# Patient Record
Sex: FEMALE | Race: WHITE | NOT HISPANIC OR LATINO
[De-identification: names, ages, dates, MRNs, and addresses within clinical notes are randomized per-mention and may not be internally consistent; named-entity substitution may affect disease eponyms.]

---

## 2017-04-07 ENCOUNTER — APPOINTMENT (OUTPATIENT)
Dept: ULTRASOUND IMAGING | Facility: HOSPITAL | Age: 67
End: 2017-04-07

## 2017-04-07 ENCOUNTER — APPOINTMENT (OUTPATIENT)
Dept: MAMMOGRAPHY | Facility: HOSPITAL | Age: 67
End: 2017-04-07

## 2017-04-07 ENCOUNTER — OUTPATIENT (OUTPATIENT)
Dept: OUTPATIENT SERVICES | Facility: HOSPITAL | Age: 67
LOS: 1 days | End: 2017-04-07
Payer: MEDICARE

## 2017-04-07 PROCEDURE — 77063 BREAST TOMOSYNTHESIS BI: CPT

## 2017-04-07 PROCEDURE — 77067 SCR MAMMO BI INCL CAD: CPT

## 2017-04-07 PROCEDURE — 76641 ULTRASOUND BREAST COMPLETE: CPT

## 2018-05-02 ENCOUNTER — APPOINTMENT (OUTPATIENT)
Dept: ULTRASOUND IMAGING | Facility: HOSPITAL | Age: 68
End: 2018-05-02
Payer: MEDICARE

## 2018-05-02 ENCOUNTER — OUTPATIENT (OUTPATIENT)
Dept: OUTPATIENT SERVICES | Facility: HOSPITAL | Age: 68
LOS: 1 days | End: 2018-05-02
Payer: MEDICARE

## 2018-05-02 ENCOUNTER — APPOINTMENT (OUTPATIENT)
Dept: MAMMOGRAPHY | Facility: HOSPITAL | Age: 68
End: 2018-05-02
Payer: MEDICARE

## 2018-05-02 DIAGNOSIS — Z00.8 ENCOUNTER FOR OTHER GENERAL EXAMINATION: ICD-10-CM

## 2018-05-02 PROCEDURE — 77063 BREAST TOMOSYNTHESIS BI: CPT

## 2018-05-02 PROCEDURE — 77063 BREAST TOMOSYNTHESIS BI: CPT | Mod: 26

## 2018-05-02 PROCEDURE — 77067 SCR MAMMO BI INCL CAD: CPT | Mod: 26

## 2018-05-02 PROCEDURE — 76641 ULTRASOUND BREAST COMPLETE: CPT | Mod: 26

## 2018-05-02 PROCEDURE — 77067 SCR MAMMO BI INCL CAD: CPT

## 2018-05-02 PROCEDURE — 76641 ULTRASOUND BREAST COMPLETE: CPT

## 2019-06-17 ENCOUNTER — OUTPATIENT (OUTPATIENT)
Dept: OUTPATIENT SERVICES | Facility: HOSPITAL | Age: 69
LOS: 1 days | End: 2019-06-17
Payer: MEDICARE

## 2019-06-17 ENCOUNTER — APPOINTMENT (OUTPATIENT)
Dept: ULTRASOUND IMAGING | Facility: HOSPITAL | Age: 69
End: 2019-06-17
Payer: MEDICARE

## 2019-06-17 ENCOUNTER — APPOINTMENT (OUTPATIENT)
Dept: MAMMOGRAPHY | Facility: HOSPITAL | Age: 69
End: 2019-06-17
Payer: MEDICARE

## 2019-06-17 DIAGNOSIS — Z00.8 ENCOUNTER FOR OTHER GENERAL EXAMINATION: ICD-10-CM

## 2019-06-17 PROCEDURE — 77063 BREAST TOMOSYNTHESIS BI: CPT | Mod: 26

## 2019-06-17 PROCEDURE — 77063 BREAST TOMOSYNTHESIS BI: CPT

## 2019-06-17 PROCEDURE — 77067 SCR MAMMO BI INCL CAD: CPT | Mod: 26

## 2019-06-17 PROCEDURE — 76641 ULTRASOUND BREAST COMPLETE: CPT | Mod: 26,50

## 2019-06-17 PROCEDURE — 76641 ULTRASOUND BREAST COMPLETE: CPT

## 2019-06-17 PROCEDURE — 77067 SCR MAMMO BI INCL CAD: CPT

## 2020-02-03 ENCOUNTER — APPOINTMENT (OUTPATIENT)
Dept: UROGYNECOLOGY | Facility: CLINIC | Age: 70
End: 2020-02-03
Payer: MEDICARE

## 2020-02-03 DIAGNOSIS — Z82.49 FAMILY HISTORY OF ISCHEMIC HEART DISEASE AND OTHER DISEASES OF THE CIRCULATORY SYSTEM: ICD-10-CM

## 2020-02-03 DIAGNOSIS — K46.9 UNSPECIFIED ABDOMINAL HERNIA W/OUT OBSTRUCTION OR GANGRENE: ICD-10-CM

## 2020-02-03 DIAGNOSIS — I10 ESSENTIAL (PRIMARY) HYPERTENSION: ICD-10-CM

## 2020-02-03 DIAGNOSIS — Z78.9 OTHER SPECIFIED HEALTH STATUS: ICD-10-CM

## 2020-02-03 DIAGNOSIS — E78.00 PURE HYPERCHOLESTEROLEMIA, UNSPECIFIED: ICD-10-CM

## 2020-02-03 PROCEDURE — 99204 OFFICE O/P NEW MOD 45 MIN: CPT

## 2020-02-03 RX ORDER — METOPROLOL TARTRATE 75 MG/1
TABLET, FILM COATED ORAL
Refills: 0 | Status: ACTIVE | COMMUNITY

## 2020-02-03 RX ORDER — AMLODIPINE BESYLATE 5 MG/1
TABLET ORAL
Refills: 0 | Status: ACTIVE | COMMUNITY

## 2020-02-03 RX ORDER — ROSUVASTATIN CALCIUM 5 MG/1
TABLET, FILM COATED ORAL
Refills: 0 | Status: ACTIVE | COMMUNITY

## 2020-02-03 RX ORDER — CANDESARTAN CILEXETIL 4 MG/1
TABLET ORAL
Refills: 0 | Status: ACTIVE | COMMUNITY

## 2020-02-03 NOTE — HISTORY OF PRESENT ILLNESS
[Cystocele (Obstetric)] : frequent [Rectal Prolapse] : frequent [Stress Incontinence] : none [Urge Incontinence Of Urine] : none [Unable To Restrain Bowel Movement] : none [Feelings Of Urinary Urgency] : rare [Pain During Urination (Dysuria)] : rare [] : months ago [FreeTextEntry1] : This patient has pelvic organ prolapse, taken care of by  (gelhorn pessary).  She suggests the prolapse has gotten worse and is here for consultation.\par \par I asked her if the pessary. No longer hold up the prolapse, and she indicates that he does hold the prolapse, so it is unclear initially what she is referred today.\par \par On examination, the pessary is in good position. The pessary was snug and was removed. There were extensive excoriations at the apex of the vagina corresponding to the circumferential part of the Gelhorn pessary E. areas looked friable, but there was no jo bleeding.\par \par It is my judgment at this pessary needs to stay out of release 2 weeks to assess her level of prolapse and see whether these excoriations. Will begin to heal. We'll make judgment as to whether or not to continue to use a pessary or to consider reconstructive surgery in 2 weeks. On reassessment.\par \par The patient became quite upset with the idea of leaving the pessary out indicating that it will be very uncomfortable. I have counseled them regarding signs and symptoms of retention or infection which have not been a problem for her in the past. Explained the rationale and risks of bleeding erosion. If we leave the pessary in place.\par \par Have her return in 2 weeks and Dr. Armando schedule a day, when I can also examine her. She's not able to pessary, reinsertion. She will likely be interested in moving quickly towards reconstructive surgery and we will facilitate that scheduling issues on her to reschedule and we can examine her together.\par \par The examination today was immediately after removing the pessary and minimal prolapse was appreciated. Examination in 2 weeks would be more revealing

## 2020-02-03 NOTE — LETTER BODY
[Dear  ___] : Dear  [unfilled], [Attached please find my note.] : Attached please find my note. [Thank you very much for allowing me to participate in the care of this patient. If you have any questions, please do not hesitate to contact me] : Thank you very much for allowing me to participate in the care of this patient. If you have any questions, please do not hesitate to contact me. [FreeTextEntry1] : This is a 69-year-old patient with pelvic organ prolapse who has a Gellhorn pessary managed by Dr. Sanchez with pessary cleaning every 3 months.  She suggests that the prolapse has gotten worse and is here for consultation.  \par \par I asked her if the pessary. No longer hold up the prolapse, and she indicates that he does so, it is unclear initially why she was referred.\par \par On examination, the pessary is in good position. The pessary was snug and was removed. Her extensive excoriations at the apex of the vagina corresponding to the circumferential part of the Gellhorn pessary. The areas looked friable because no jo bleeding today.\par \par It is my just and that this pessary needs to stay out for 2 weeks to assess the level of the prolapse and to see whether these excoriations. Will begin to heal. Judgment as to whether or not to continue to use a pessary, which were treated her to surgery to assist in 2 weeks.\par \par The patient became quite upset with the idea of leaving the pessary out, indicating it is difficult to walk. The patient her daughter deny recurrent cystitis and/ or retention, because she was out. She suggests the prolapse to protrude quite a bit and to be unable to walk. I explained the rationale and risks of bleeding and erosion. He quite anxious to have the pessary. The reinserted or Motrin. Surgery.\par \par I will ask her to return Dr. Armando schedule, so that if surgery is desired became best expedite next obstetric history to surgery.\par \par The prolapse appreciated. Today, is immediately after the pessary was removed. Minimal prolapse was evident.

## 2020-02-21 ENCOUNTER — APPOINTMENT (OUTPATIENT)
Dept: UROGYNECOLOGY | Facility: CLINIC | Age: 70
End: 2020-02-21
Payer: MEDICARE

## 2020-02-21 DIAGNOSIS — R39.198 OTHER DIFFICULTIES WITH MICTURITION: ICD-10-CM

## 2020-02-21 PROCEDURE — 99215 OFFICE O/P EST HI 40 MIN: CPT

## 2020-02-21 PROCEDURE — A4562: CPT

## 2020-02-21 NOTE — PROCEDURE
[Good Fit] : fits well [Erosion] : evidence of erosion [Erythema] : no erythema [Refit] : refit needed [Pessary Inserted] : inserted [Infection] : no evidence of infection [Discharge] : no vaginal discharge [FreeTextEntry1] : 2.5 Yennifer [None] : no bleeding [FreeTextEntry2] : 2 3/4 gellhorn pt had was tight fit [de-identified] : on cervix

## 2020-02-21 NOTE — DISCUSSION/SUMMARY
[FreeTextEntry1] : I reviewed the above findings with the patient and her daughter and . Treatment options for the prolapse were discussed and included doing nothing, Kegel exercises and behavioral modification, a pessary, or surgical correction.Surgically we discussed the abdominal vs the vaginal routes. Abdominally we discussed a hysterectomy and a sacral colpopexy   laparoscopically and robotically.  Vaginally we discussed a vaginal hysterectomy, uterosacral suspension, and anterior/posterior repair vs closure.  pt will see how she feels with new pessary and is considering surgery.  If wants surgery we discussed urodyn and TVS.  pt to f/u in 2-3 weeks.  IUGA pt info on POP in english and Irish given.  All questions answered.\par

## 2020-02-21 NOTE — REASON FOR VISIT
[Follow-up Visit ___] : a follow-up visit  for [unfilled] [Patient Declined  Services] : - None: Patient declined  services [FreeTextEntry3] : pt's daughter interpreted. [FreeTextEntry2] : Natividad

## 2020-02-21 NOTE — HISTORY OF PRESENT ILLNESS
[Rectal Prolapse] : daily [Unable To Restrain Bowel Movement] : none [Urinary Frequency] : daily [de-identified] : pt has had pessary for about 15 years [FreeTextEntry5] : since pessary out. [FreeTextEntry4] : pt saw some blood when wiped when pessary removed [de-identified] : without pessary [FreeTextEntry1] : pessary out for the 3 weeks and pt has been uncomfortable without it.\par with pessary pt reports difficulty urinating flow is better with pessary\par

## 2020-02-21 NOTE — PHYSICAL EXAM
[No Acute Distress] : in no acute distress [Well developed] : well developed [Normal Mood/Affect] : mood and affect are normal [Well Nourished] : ~L well nourished [Soft, Nontender] : the abdomen was soft and nontender [Warm and Dry] : was warm and dry to touch [Aa ____] : Aa [unfilled] [Ba ____] : Ba [unfilled] [C ____] : C [unfilled] [GH ____] : GH [unfilled] [PB ____] : PB [unfilled] [TVL ____] : TVL  [unfilled] [Ap ____] : Ap [unfilled] [Bp ____] : Bp [unfilled] [Normal] : normal [Uterine Adnexae] : were not tender and not enlarged [D ____] : D [unfilled] [Exam Deferred] : was deferred [FreeTextEntry3] : + hypermobility [Anxiety] : patient is not anxious [de-identified] : + excoriation around cervix

## 2020-03-13 ENCOUNTER — APPOINTMENT (OUTPATIENT)
Dept: UROGYNECOLOGY | Facility: CLINIC | Age: 70
End: 2020-03-13
Payer: MEDICARE

## 2020-03-13 PROCEDURE — 99213 OFFICE O/P EST LOW 20 MIN: CPT

## 2020-03-13 NOTE — HISTORY OF PRESENT ILLNESS
[FreeTextEntry1] : PT was fitted with Gellhorn LS # 2 1/2 on 2/21/2020.  She was comfortable but had a BM and dislodged it and fell out.  PT called but wasn't able to get a sooner appt and place the pessary back in.  She can feel her prolapse overlapping a little.  Denies any pain, discomfort, or vaginal bleeding.  Voiding and moving with occasional straining.  \par Pt still contemplating about surgery.  She is aware to follow up with one of the surgeon if she decides to have surgery.  Was told she will need to have pelvic US if she decides to.

## 2020-03-13 NOTE — PROCEDURE
[Refit] : refit needed [Discharge] : there is vaginal discharge [Pessary Inserted] : inserted [Pessary Washed] : washed [H2O] : H2O [None] : no bleeding [Bowel Management] : Bowel Management: patient verbalizes understanding of daily dietary fiber intake [Bladder Training] : Bladder Training: Patient given information with verbal understanding [Good Fit] : fit is not good [Erythema] : no erythema [Infection] : no evidence of infection [FreeTextEntry1] : Yennifer RODRIGUEZ # 2 1/2. [de-identified] : overlapping and had fell out once with bearing down [de-identified] : Yennifer RODRIGUEZ # 2 3/4 [FreeTextEntry4] : Stool softener [FreeTextEntry8] : Reinforced daily pericare.

## 2020-03-13 NOTE — PHYSICAL EXAM
[No Acute Distress] : in no acute distress [Well developed] : well developed [Well Nourished] : ~L well nourished [Good Hygeine] : demonstrates good hygeine [Oriented x3] : oriented to person, place, and time [Normal Mood/Affect] : mood and affect are normal [Soft, Nontender] : the abdomen was soft and nontender [Warm and Dry] : was warm and dry to touch [Normal Gait] : gait was normal [Vulvar Atrophy] : vulvar atrophy [Atrophy] : atrophy [Cystocele] : a cystocele [Uterine Prolapse] : uterine prolapse [Discharge] : a  ~M vaginal discharge was present [Scant] : scant [Lakesha] : yellow [No Bleeding] : there was no active vaginal bleeding [Soft] :  the cervix was soft [de-identified] : Prolapse overlapping pessary.

## 2020-03-13 NOTE — DISCUSSION/SUMMARY
[FreeTextEntry1] : Fitted with Gellhorn LS # 2 3/4.  If pt decides to have surgery, she will need to leave her pessary out and will order Pelvic US (as per Dr. Brunner's recommendation).  Will make appt with surgeon.  \par Follow up in 1 month for pelvic prolapse/pessary check.  If pt have any problems/concern to call office.  PT aware and agrees.

## 2020-04-13 ENCOUNTER — APPOINTMENT (OUTPATIENT)
Dept: UROGYNECOLOGY | Facility: CLINIC | Age: 70
End: 2020-04-13

## 2020-06-05 ENCOUNTER — APPOINTMENT (OUTPATIENT)
Dept: UROGYNECOLOGY | Facility: CLINIC | Age: 70
End: 2020-06-05
Payer: MEDICARE

## 2020-06-05 VITALS — TEMPERATURE: 98.4 F

## 2020-06-05 PROCEDURE — 99213 OFFICE O/P EST LOW 20 MIN: CPT

## 2020-06-05 NOTE — DISCUSSION/SUMMARY
[FreeTextEntry1] : F/u 3 months or sooner if needed.  Advised colace PRN.  Instructed to call with any questions or concerns and she verbalizes understanding.\par

## 2020-06-05 NOTE — PROCEDURE
[Good Fit] : fits well [Erythema] : erythema noted [Discharge] : there is vaginal discharge [H2O] : H2O [Pessary Washed] : washed [Pessary Inserted] : inserted [None] : no bleeding [Erosion] : no evidence of erosion [Infection] : no evidence of infection [FreeTextEntry1] : michelle LS 2 3/4

## 2020-06-05 NOTE — HISTORY OF PRESENT ILLNESS
[FreeTextEntry1] : Pt happy with pessary.  Reports good support, denies any pelvic pain, pressure or vaginal bleeding.  Denies any problems with urination.  She does note one episode of constipation and straining for BM where she felt pessary coming down but she was able to push it up without difficulty.

## 2020-06-05 NOTE — PHYSICAL EXAM
[No Acute Distress] : in no acute distress [Well developed] : well developed [Well Nourished] : ~L well nourished [Good Hygeine] : demonstrates good hygeine [Oriented x3] : oriented to person, place, and time [Normal Memory] : ~T memory was ~L unimpaired [Normal Mood/Affect] : mood and affect are normal [Soft, Nontender] : the abdomen was soft and nontender [Warm and Dry] : was warm and dry to touch [Normal Gait] : gait was normal [Normal Appearance] : general appearance was normal [Atrophy] : atrophy [Cystocele] : a cystocele [Uterine Prolapse] : uterine prolapse [Discharge] : a  ~M vaginal discharge was present [Moderate] : moderate [Lakesha] : yellow [Normal] : normal [No Bleeding] : there was no active vaginal bleeding

## 2020-09-11 ENCOUNTER — APPOINTMENT (OUTPATIENT)
Dept: UROGYNECOLOGY | Facility: CLINIC | Age: 70
End: 2020-09-11
Payer: MEDICARE

## 2020-09-11 PROCEDURE — 99213 OFFICE O/P EST LOW 20 MIN: CPT

## 2020-09-11 NOTE — HISTORY OF PRESENT ILLNESS
[FreeTextEntry1] : Pt happy with pessary.  She reports good support.  Denies any pelvic pain, pressure or vaginal bleeding.  Denies any problems with urination or BM.  Denies any other changes since last visit.

## 2020-09-11 NOTE — PHYSICAL EXAM
[No Acute Distress] : in no acute distress [Well developed] : well developed [Well Nourished] : ~L well nourished [Good Hygeine] : demonstrates good hygeine [Oriented x3] : oriented to person, place, and time [Normal Memory] : ~T memory was ~L unimpaired [Normal Mood/Affect] : mood and affect are normal [Soft, Nontender] : the abdomen was soft and nontender [Warm and Dry] : was warm and dry to touch [Normal Gait] : gait was normal [Normal Appearance] : general appearance was normal [Atrophy] : atrophy [Cystocele] : a cystocele [Uterine Prolapse] : uterine prolapse [Discharge] : a  ~M vaginal discharge was present [White] : white [Scant] : scant [Normal] : normal [No Bleeding] : there was no active vaginal bleeding

## 2020-09-11 NOTE — PROCEDURE
[Good Fit] : fits well [Discharge] : there is vaginal discharge [Pessary Inserted] : inserted [H2O] : H2O [Pessary Washed] : washed [None] : no bleeding [Erosion] : no evidence of erosion [Infection] : no evidence of infection [Erythema] : no erythema [FreeTextEntry1] : michelle LS 2 3/4

## 2020-12-10 ENCOUNTER — APPOINTMENT (OUTPATIENT)
Dept: UROGYNECOLOGY | Facility: CLINIC | Age: 70
End: 2020-12-10
Payer: MEDICARE

## 2020-12-10 VITALS — DIASTOLIC BLOOD PRESSURE: 84 MMHG | TEMPERATURE: 97.5 F | HEART RATE: 85 BPM | SYSTOLIC BLOOD PRESSURE: 157 MMHG

## 2020-12-10 PROCEDURE — 99213 OFFICE O/P EST LOW 20 MIN: CPT

## 2020-12-10 NOTE — PROCEDURE
[Good Fit] : fits well [Discharge] : there is vaginal discharge [Pessary Inserted] : inserted [Pessary Washed] : washed [H2O] : H2O [None] : no bleeding [Erosion] : no evidence of erosion [Erythema] : no erythema [Infection] : no evidence of infection [FreeTextEntry1] : michelle LS 2 3/4

## 2020-12-10 NOTE — PHYSICAL EXAM
[No Acute Distress] : in no acute distress [Well developed] : well developed [Well Nourished] : ~L well nourished [Good Hygeine] : demonstrates good hygeine [Oriented x3] : oriented to person, place, and time [Normal Memory] : ~T memory was ~L unimpaired [Normal Mood/Affect] : mood and affect are normal [Soft, Nontender] : the abdomen was soft and nontender [Warm and Dry] : was warm and dry to touch [Normal Gait] : gait was normal [Normal Appearance] : general appearance was normal [Atrophy] : atrophy [Cystocele] : a cystocele [Uterine Prolapse] : uterine prolapse [Discharge] : a  ~M vaginal discharge was present [Scant] : scant [White] : white [No Bleeding] : there was no active vaginal bleeding [Normal] : normal

## 2021-02-24 ENCOUNTER — TRANSCRIPTION ENCOUNTER (OUTPATIENT)
Age: 71
End: 2021-02-24

## 2021-03-11 ENCOUNTER — APPOINTMENT (OUTPATIENT)
Dept: UROGYNECOLOGY | Facility: CLINIC | Age: 71
End: 2021-03-11
Payer: MEDICARE

## 2021-03-11 PROCEDURE — 99213 OFFICE O/P EST LOW 20 MIN: CPT

## 2021-03-11 NOTE — PROCEDURE
[Good Fit] : fits well [Erosion] : no evidence of erosion [Erythema] : no erythema [Discharge] : there is vaginal discharge [Infection] : no evidence of infection [Pessary Inserted] : inserted [Pessary Washed] : washed [H2O] : H2O [None] : no bleeding [FreeTextEntry1] : michelle LS 2 3/4

## 2021-06-10 ENCOUNTER — APPOINTMENT (OUTPATIENT)
Dept: UROGYNECOLOGY | Facility: CLINIC | Age: 71
End: 2021-06-10
Payer: MEDICARE

## 2021-06-10 VITALS
HEIGHT: 60 IN | HEART RATE: 86 BPM | OXYGEN SATURATION: 100 % | SYSTOLIC BLOOD PRESSURE: 166 MMHG | DIASTOLIC BLOOD PRESSURE: 81 MMHG | WEIGHT: 137 LBS | TEMPERATURE: 97.8 F | BODY MASS INDEX: 26.9 KG/M2

## 2021-06-10 DIAGNOSIS — N89.8 OTHER SPECIFIED NONINFLAMMATORY DISORDERS OF VAGINA: ICD-10-CM

## 2021-06-10 PROCEDURE — 99213 OFFICE O/P EST LOW 20 MIN: CPT

## 2021-07-21 ENCOUNTER — APPOINTMENT (OUTPATIENT)
Dept: UROGYNECOLOGY | Facility: CLINIC | Age: 71
End: 2021-07-21
Payer: MEDICARE

## 2021-07-21 VITALS — HEART RATE: 80 BPM | SYSTOLIC BLOOD PRESSURE: 154 MMHG | DIASTOLIC BLOOD PRESSURE: 77 MMHG

## 2021-07-21 PROCEDURE — 99213 OFFICE O/P EST LOW 20 MIN: CPT

## 2021-07-21 NOTE — HISTORY OF PRESENT ILLNESS
[FreeTextEntry1] : Patient with known hx of POP managed with pessary GH LS 2 3/4 presents to office for follow up and pessary check. patient is happy with the pessary. Denies any changes since last visit. Denies pelvic pressure, pain or vaginal bleeding. Denies urinary or bowel complaints.

## 2021-07-21 NOTE — PROCEDURE
[Good Fit] : fits well [Discharge] : there is vaginal discharge [Pessary Inserted] : inserted [Pessary Washed] : washed [H2O] : H2O [None] : no bleeding [Erosion] : no evidence of erosion [Erythema] : no erythema [Infection] : no evidence of infection [FreeTextEntry1] : michelle LS 2 3/4 [FreeTextEntry8] : Routine Lexii care

## 2021-07-21 NOTE — DISCUSSION/SUMMARY
[FreeTextEntry1] : 1. POP\par - Patient doing well with pessary\par - Pessary precautions and instructions reviewed\par - Will RTO in 3 months for follow up of POP or sooner if needed\par \par Patient agrees to call office with any questions or concerns, verbalized understanding.

## 2021-09-10 ENCOUNTER — APPOINTMENT (OUTPATIENT)
Dept: UROGYNECOLOGY | Facility: CLINIC | Age: 71
End: 2021-09-10

## 2021-09-13 ENCOUNTER — APPOINTMENT (OUTPATIENT)
Dept: NEUROLOGY | Facility: CLINIC | Age: 71
End: 2021-09-13
Payer: MEDICARE

## 2021-09-13 VITALS
HEART RATE: 79 BPM | WEIGHT: 137 LBS | DIASTOLIC BLOOD PRESSURE: 77 MMHG | OXYGEN SATURATION: 99 % | SYSTOLIC BLOOD PRESSURE: 150 MMHG | BODY MASS INDEX: 19.18 KG/M2 | HEIGHT: 71 IN

## 2021-09-13 DIAGNOSIS — G20 PARKINSON'S DISEASE: ICD-10-CM

## 2021-09-13 PROCEDURE — 99205 OFFICE O/P NEW HI 60 MIN: CPT

## 2021-09-13 NOTE — DISCUSSION/SUMMARY
[FreeTextEntry1] : 71-year-old right-handed female presents with chief complaints of one year history of left arm tremor. On exam patient is noted to have grade 2 resting tremor in the left upper and lower extremity. She has bilateral bradykinesia left worse than right. Slight increase in tone in the left upper extremity. Reduced left arm swing.\par \par Impression- parkinsonism, most likely idiopathic Parkinson's disease\par \par Plan\par i had an extensive discussion with the patient and her daughter. All the questions were addressed and answered\par MRI brain to rule out intracranial causes of left-sided tremor and slowness\par Jerald scan to assess dopamine function\par Trial of Sinemet, side effects discussed in detail. Patient would like to start this after her narrow angle glaucoma surgery which is coming up this week\par Followup in 2 months

## 2021-09-13 NOTE — PHYSICAL EXAM
[FreeTextEntry1] : on exam patient is awake and alert. She is very anxious.\par face is symmetric, tongue and uvula midline symmetric. Extraocular movements are intact\par Strength is 5/5 in all extremities\par Deep tendon reflexes are brisk but equal\par Grade 2 resting tremor is noticed in the left arm and leg\par Minimal postural tremor and intention tremor is seen\par Bilateral bradykinesia one on the right 3 on the left\par Leg agility is normal\par Rigidity zero on the right one on the left upper extremity\par No difficulty getting up from the chair\par Gait is remarkable for reduced left arm swing\par pull test is negative

## 2021-09-13 NOTE — HISTORY OF PRESENT ILLNESS
[FreeTextEntry1] : This is a 71-year-old right-handed female who presents with her daughter Natividad,with chief complaints of left hand tremor for about a year\par \par patient states that especially when she is under stress she's noticed that her left hand is shaking. This is mostly at rest. It does not interfere with daily activities. There are no changes in her fine motor skills. No slowness or stiffness\par \par she denies anosmia, sialorrhea, dysphagia, changes in memory. Denies any constipation or changes in control of urine. Denies any difficulty with sleep.No change in balance no falls\par \par reports having anxiety and recent panic attacks\par \par review of systems-a complete review of systems is performed and is negative except as listed in history of present illness\par \par past medical history- hypertension, high cholesterol panic attacks, glaucoma

## 2021-10-20 ENCOUNTER — APPOINTMENT (OUTPATIENT)
Dept: UROGYNECOLOGY | Facility: CLINIC | Age: 71
End: 2021-10-20
Payer: MEDICARE

## 2021-10-20 VITALS — TEMPERATURE: 97.1 F

## 2021-10-20 PROCEDURE — 99213 OFFICE O/P EST LOW 20 MIN: CPT

## 2021-10-20 NOTE — DISCUSSION/SUMMARY
[FreeTextEntry1] : 1. POP\par - Patient doing well with pessary\par - Pessary precautions and instructions reviewed\par - Advised to use OTC vaginal lotion replens \par - Will RTO in 3 months for follow up of POP or sooner if needed\par \par Patient agrees to call office with any questions or concerns, verbalized understanding.

## 2021-10-20 NOTE — PROCEDURE
[Good Fit] : fits well [Discharge] : there is vaginal discharge [Pessary Inserted] : inserted [Pessary Washed] : washed [H2O] : H2O [None] : no bleeding [Erythema] : erythema noted [Erosion] : no evidence of erosion [Infection] : no evidence of infection [FreeTextEntry1] : michelle LS 2 3/4 [de-identified] : mild erythema to posterior vaginal wall  [FreeTextEntry8] : Routine Lexii care

## 2021-10-20 NOTE — PHYSICAL EXAM
[No Acute Distress] : in no acute distress [Well developed] : well developed [Well Nourished] : ~L well nourished [Good Hygeine] : demonstrates good hygeine [Oriented x3] : oriented to person, place, and time [Normal Memory] : ~T memory was ~L unimpaired [Normal Mood/Affect] : mood and affect are normal [Soft, Nontender] : the abdomen was soft and nontender [Warm and Dry] : was warm and dry to touch [Normal Gait] : gait was normal [Labia Majora] : were normal [Labia Minora] : were normal [Atrophy] : atrophy [Normal Appearance] : general appearance was normal [Cystocele] : a cystocele [Uterine Prolapse] : uterine prolapse [Discharge] : a  ~M vaginal discharge was present [Scant] : scant [White] : white [No Bleeding] : there was no active vaginal bleeding [Normal] : normal

## 2022-01-03 ENCOUNTER — APPOINTMENT (OUTPATIENT)
Dept: NEUROLOGY | Facility: CLINIC | Age: 72
End: 2022-01-03

## 2022-01-21 ENCOUNTER — APPOINTMENT (OUTPATIENT)
Dept: UROGYNECOLOGY | Facility: CLINIC | Age: 72
End: 2022-01-21
Payer: MEDICARE

## 2022-01-21 VITALS — TEMPERATURE: 96.9 F

## 2022-01-21 PROCEDURE — 99213 OFFICE O/P EST LOW 20 MIN: CPT

## 2022-04-22 ENCOUNTER — APPOINTMENT (OUTPATIENT)
Dept: UROGYNECOLOGY | Facility: CLINIC | Age: 72
End: 2022-04-22
Payer: MEDICARE

## 2022-04-22 PROCEDURE — 99213 OFFICE O/P EST LOW 20 MIN: CPT

## 2022-04-22 NOTE — PHYSICAL EXAM
[No Acute Distress] : in no acute distress [Well developed] : well developed [Well Nourished] : ~L well nourished [Good Hygeine] : demonstrates good hygeine [Oriented x3] : oriented to person, place, and time [Normal Memory] : ~T memory was ~L unimpaired [Normal Mood/Affect] : mood and affect are normal [Soft, Nontender] : the abdomen was soft and nontender [Warm and Dry] : was warm and dry to touch [Normal Gait] : gait was normal [Vulvar Atrophy] : vulvar atrophy [Labia Majora] : were normal [Labia Minora] : were normal [Normal] : was normal [Normal Appearance] : general appearance was normal [Atrophy] : atrophy [Dry Mucosa] : dry mucosa [Cystocele] : a cystocele [Uterine Prolapse] : uterine prolapse [Discharge] : a  ~M vaginal discharge was present [Scant] : scant [White] : white [No Bleeding] : there was no active vaginal bleeding

## 2022-04-26 ENCOUNTER — APPOINTMENT (OUTPATIENT)
Dept: NEUROLOGY | Facility: CLINIC | Age: 72
End: 2022-04-26
Payer: MEDICARE

## 2022-04-26 VITALS
WEIGHT: 137 LBS | HEIGHT: 71 IN | DIASTOLIC BLOOD PRESSURE: 79 MMHG | BODY MASS INDEX: 19.18 KG/M2 | HEART RATE: 83 BPM | SYSTOLIC BLOOD PRESSURE: 155 MMHG

## 2022-04-26 PROCEDURE — 99214 OFFICE O/P EST MOD 30 MIN: CPT

## 2022-04-26 NOTE — PHYSICAL EXAM
[FreeTextEntry1] : on exam patient is awake and alert. She is very anxious.\par face is symmetric, tongue and uvula midline symmetric. Extraocular movements are intact\par Strength is 5/5 in all extremities\par Deep tendon reflexes are brisk but equal\par Intermittent grade 1 tremor is seen in the left upper extremity.  No tremors seen in the left leg today\par No postural tremor and intention tremor is seen\par Bilateral bradykinesia one on the right 2-3 on the left patient also has arthritis in her hands\par Leg agility is normal\par Rigidity zero on the right one on the left upper extremity\par No difficulty getting up from the chair\par Gait is remarkable for reduced left arm swing\par pull test is negative

## 2022-04-26 NOTE — HISTORY OF PRESENT ILLNESS
[FreeTextEntry1] : Alicia is a 70y/o female with h/o pelvic organ prolapse managed with pessary GH LS #2 3/4.  Patient is satisfied with the support provided by pessary. She denies pelvic pain or vaginal bleeding. She denies urinary or bowel complaints.

## 2022-04-26 NOTE — DATA REVIEWED
[de-identified] : MRI of the brain done 2/15/2022 without contrast shows minimal small vessel ischemic disease changes and partially empty sella

## 2022-04-26 NOTE — DISCUSSION/SUMMARY
[FreeTextEntry1] : #Pelvic organ prolapse\par - Patient doing well with pessary\par - Pessary precautions and instructions reviewed\par - Advised to use OTC vaginal lotion (ie replens) \par - Will RTO in 3 months for follow or sooner if needed\par \par Patient agrees to call office with any questions or concerns, verbalized understanding.

## 2022-04-26 NOTE — DISCUSSION/SUMMARY
[FreeTextEntry1] : 71-year-old right-handed female presents with chief complaints of  left arm tremor since 2021.  MRI brain with minimal small vessel changes.  Patient reports 90% improvement of symptoms with Sinemet and no side effects.  Tremors much improved.  Still noted to have moderate bradykinesia\par \par Impression- parkinsonism, most likely idiopathic Parkinson's disease\par \par Plan\par i had an extensive discussion with the patient and her daughter. All the questions were addressed and answered\par Results of MRI brain were discussed with the patient\par Continue Sinemet 1 tablet 3 times a day.  Patient will change timing so that she takes it every 5 hours.  She does not wish to increase at present time although she has some bradykinesia it does not interfere with daily functioning\par Recommended treatment for anxiety.  Offered Lexapro.  Patient declines.  States that they today she has no problems it is it is only occasionally for traveling and doctors visits where she finds it to be exaggerated.  She states that her primary care physician did give her something to take as needed.  She will let me know the name of it she is interested in trial of clonazepam/Ativan.  I am not sure what was prescribed by her primary care physician they will call and let me know\par Fall precautions, physical therapy prescription provided\par Followup in 3 months

## 2022-04-26 NOTE — PROCEDURE
[Good Fit] : fits well [Pessary Inserted] : inserted [Pessary Washed] : washed [None] : no bleeding [Medication Review] : Medicaiton Review: Patient verbalizes understanding of risks and benefits [Refit] : refit is not needed [Erythema] : no erythema [Erosion] : no evidence of erosion [Discharge] : no vaginal discharge [Infection] : no evidence of infection [FreeTextEntry8] : Routine Lexii care [FreeTextEntry1] : Radha LS 2 3/4

## 2022-05-05 ENCOUNTER — NON-APPOINTMENT (OUTPATIENT)
Age: 72
End: 2022-05-05

## 2022-05-06 ENCOUNTER — NON-APPOINTMENT (OUTPATIENT)
Age: 72
End: 2022-05-06

## 2022-06-03 ENCOUNTER — APPOINTMENT (OUTPATIENT)
Dept: NEUROLOGY | Facility: CLINIC | Age: 72
End: 2022-06-03

## 2022-06-21 ENCOUNTER — APPOINTMENT (OUTPATIENT)
Dept: RADIOLOGY | Facility: HOSPITAL | Age: 72
End: 2022-06-21
Payer: MEDICARE

## 2022-06-21 ENCOUNTER — OUTPATIENT (OUTPATIENT)
Dept: OUTPATIENT SERVICES | Facility: HOSPITAL | Age: 72
LOS: 1 days | End: 2022-06-21
Payer: MEDICARE

## 2022-06-21 DIAGNOSIS — Z00.8 ENCOUNTER FOR OTHER GENERAL EXAMINATION: ICD-10-CM

## 2022-06-21 PROCEDURE — 71046 X-RAY EXAM CHEST 2 VIEWS: CPT

## 2022-06-21 PROCEDURE — 71046 X-RAY EXAM CHEST 2 VIEWS: CPT | Mod: 26

## 2022-07-25 ENCOUNTER — APPOINTMENT (OUTPATIENT)
Dept: UROGYNECOLOGY | Facility: CLINIC | Age: 72
End: 2022-07-25

## 2022-07-25 VITALS
OXYGEN SATURATION: 99 % | SYSTOLIC BLOOD PRESSURE: 146 MMHG | HEART RATE: 85 BPM | DIASTOLIC BLOOD PRESSURE: 76 MMHG | TEMPERATURE: 96.9 F

## 2022-07-25 PROCEDURE — 99213 OFFICE O/P EST LOW 20 MIN: CPT

## 2022-07-29 ENCOUNTER — APPOINTMENT (OUTPATIENT)
Dept: NEUROLOGY | Facility: CLINIC | Age: 72
End: 2022-07-29

## 2022-09-13 ENCOUNTER — APPOINTMENT (OUTPATIENT)
Dept: NEUROLOGY | Facility: CLINIC | Age: 72
End: 2022-09-13

## 2022-09-13 VITALS
DIASTOLIC BLOOD PRESSURE: 76 MMHG | HEART RATE: 87 BPM | SYSTOLIC BLOOD PRESSURE: 156 MMHG | WEIGHT: 136 LBS | HEIGHT: 71 IN | BODY MASS INDEX: 19.04 KG/M2

## 2022-09-13 PROCEDURE — 99214 OFFICE O/P EST MOD 30 MIN: CPT

## 2022-09-13 NOTE — DISCUSSION/SUMMARY
[FreeTextEntry1] : 72-year-old right-handed female presents with chief complaints of  left arm tremor since 2021.  MRI brain with minimal small vessel changes.  Patient reports 90% improvement of symptoms with Sinemet and no side effects.  Tremors much improved.  Still noted to have moderate bradykinesia\par \par Impression- parkinsonism, most likely idiopathic Parkinson's disease\par \par Plan\par i had an extensive discussion with the patient and her . All the questions were addressed and answered\par Continue Sinemet 1 tablet 3 times a day.  \par Fall precautions\par Follow-up in 3 months

## 2022-09-13 NOTE — PHYSICAL EXAM
[FreeTextEntry1] : on exam patient is awake and alert. She is very anxious.\par face is symmetric, tongue and uvula midline symmetric. Extraocular movements are intact\par Strength is 5/5 in all extremities\par Deep tendon reflexes are brisk but equal\par Intermittent grade 1 tremor is seen in the left upper extremity. \par No postural tremor and intention tremor is seen\par Bilateral bradykinesia 0 on the right 2 on the left patient also has arthritis in her hands\par Leg agility is normal\par Rigidity zero on the right one on the left upper extremity\par No difficulty getting up from the chair\par Gait is remarkable for reduced left arm swing\par pull test is negative

## 2022-09-13 NOTE — HISTORY OF PRESENT ILLNESS
[FreeTextEntry1] : This is a 71-year-old right-handed female who presents with her daughter Natividad,with chief complaints of left hand tremor for about a year\par \par patient states that especially when she is under stress she's noticed that her left hand is shaking. This is mostly at rest. It does not interfere with daily activities. There are no changes in her fine motor skills. No slowness or stiffness\par \par she denies anosmia, sialorrhea, dysphagia, changes in memory. Denies any constipation or changes in control of urine. Denies any difficulty with sleep.No change in balance no falls\par \par reports having anxiety and recent panic attacks\par \par review of systems-a complete review of systems is performed and is negative except as listed in history of present illness\par \par past medical history- hypertension, high cholesterol panic attacks, glaucoma\par \par Interval history-April 26, 2022 patient presents to follow-up on Parkinson's disease.  At this visit she is accompanied by her daughter Kathy.  Patient reports 90% improvement in symptoms on initiating Sinemet.  She denies any side effects on this medication.  Currently takes it at 8 AM, 12 noon and 8 PM.  She goes to bed around 10.  She was doing quite well until this Saturday when she slipped on a wet surface.  She sustained some bruising on the left hip and thigh.  She followed with her primary care physician.  Denies having any fractures.  She reports significant anxiety.  She states it is especially worse for doctors visits.  She is following up with ophthalmology for narrow angle glaucoma and states that she was unable to even sit still for the examination because when she is nervous she has a lot of tremor.\par \par Interval history September 13, 2022.  Patient is accompanied by her  to follow-up on Parkinson's disease.  She states that she is doing quite well denies any side effects has not had any falls she continues to take Sinemet 25/100, 1 tablet 3 times a day 5 hours apart.  She did go for physical therapy and is not doing the exercises at home.  States that she notices the tremor only when she is anxious.  States that her ability to do activities is  improved significantly

## 2022-09-13 NOTE — DATA REVIEWED
[de-identified] : MRI of the brain done 2/15/2022 without contrast shows minimal small vessel ischemic disease changes and partially empty sella

## 2022-10-24 ENCOUNTER — APPOINTMENT (OUTPATIENT)
Dept: UROGYNECOLOGY | Facility: CLINIC | Age: 72
End: 2022-10-24

## 2022-10-24 VITALS — TEMPERATURE: 96.9 F

## 2022-10-24 PROCEDURE — 99213 OFFICE O/P EST LOW 20 MIN: CPT

## 2022-11-16 ENCOUNTER — OUTPATIENT (OUTPATIENT)
Dept: OUTPATIENT SERVICES | Facility: HOSPITAL | Age: 72
LOS: 1 days | End: 2022-11-16
Payer: MEDICARE

## 2022-11-16 ENCOUNTER — APPOINTMENT (OUTPATIENT)
Dept: MAMMOGRAPHY | Facility: HOSPITAL | Age: 72
End: 2022-11-16

## 2022-11-16 ENCOUNTER — APPOINTMENT (OUTPATIENT)
Dept: ULTRASOUND IMAGING | Facility: HOSPITAL | Age: 72
End: 2022-11-16

## 2022-11-16 DIAGNOSIS — Z00.8 ENCOUNTER FOR OTHER GENERAL EXAMINATION: ICD-10-CM

## 2022-11-16 PROCEDURE — 76641 ULTRASOUND BREAST COMPLETE: CPT

## 2022-11-16 PROCEDURE — 77067 SCR MAMMO BI INCL CAD: CPT | Mod: 26

## 2022-11-16 PROCEDURE — 76641 ULTRASOUND BREAST COMPLETE: CPT | Mod: 26,50

## 2022-11-16 PROCEDURE — 77063 BREAST TOMOSYNTHESIS BI: CPT | Mod: 26

## 2022-11-16 PROCEDURE — 77063 BREAST TOMOSYNTHESIS BI: CPT

## 2022-11-16 PROCEDURE — 77067 SCR MAMMO BI INCL CAD: CPT

## 2023-01-23 ENCOUNTER — APPOINTMENT (OUTPATIENT)
Dept: UROGYNECOLOGY | Facility: CLINIC | Age: 73
End: 2023-01-23
Payer: MEDICARE

## 2023-01-23 VITALS
BODY MASS INDEX: 27.75 KG/M2 | SYSTOLIC BLOOD PRESSURE: 134 MMHG | DIASTOLIC BLOOD PRESSURE: 79 MMHG | HEART RATE: 75 BPM | OXYGEN SATURATION: 100 % | WEIGHT: 147 LBS | HEIGHT: 61 IN

## 2023-01-23 DIAGNOSIS — N81.9 FEMALE GENITAL PROLAPSE, UNSPECIFIED: ICD-10-CM

## 2023-01-23 PROCEDURE — 99213 OFFICE O/P EST LOW 20 MIN: CPT

## 2023-02-01 ENCOUNTER — APPOINTMENT (OUTPATIENT)
Dept: NEUROLOGY | Facility: CLINIC | Age: 73
End: 2023-02-01
Payer: MEDICARE

## 2023-02-01 VITALS
RESPIRATION RATE: 17 BRPM | HEART RATE: 77 BPM | WEIGHT: 143 LBS | HEIGHT: 61 IN | DIASTOLIC BLOOD PRESSURE: 81 MMHG | BODY MASS INDEX: 27 KG/M2 | SYSTOLIC BLOOD PRESSURE: 147 MMHG

## 2023-02-01 PROCEDURE — 99214 OFFICE O/P EST MOD 30 MIN: CPT

## 2023-02-01 NOTE — PHYSICAL EXAM
[FreeTextEntry1] : on exam patient is awake and alert. She is very anxious.\par face is symmetric, tongue and uvula midline symmetric. Extraocular movements are intact\par Strength is 5/5 in all extremities\par Deep tendon reflexes are brisk but equal\par Intermittent grade 1 tremor is seen in the left upper extremity. \par No postural tremor and intention tremor is seen\par Bilateral bradykinesia 1 on the right 2 on the left patient also has arthritis in her hands\par Leg agility is normal\par Rigidity zero on the right one on the left upper extremity\par No difficulty getting up from the chair\par Gait is remarkable for reduced left arm swing, stooped posture\par pull test is negative

## 2023-02-01 NOTE — HISTORY OF PRESENT ILLNESS
[FreeTextEntry1] : This is a 71-year-old right-handed female who presents with her daughter Natividad,with chief complaints of left hand tremor for about a year\par \par patient states that especially when she is under stress she's noticed that her left hand is shaking. This is mostly at rest. It does not interfere with daily activities. There are no changes in her fine motor skills. No slowness or stiffness\par \par she denies anosmia, sialorrhea, dysphagia, changes in memory. Denies any constipation or changes in control of urine. Denies any difficulty with sleep.No change in balance no falls\par \par reports having anxiety and recent panic attacks\par \par review of systems-a complete review of systems is performed and is negative except as listed in history of present illness\par \par past medical history- hypertension, high cholesterol panic attacks, glaucoma\par \par Interval history-April 26, 2022 patient presents to follow-up on Parkinson's disease.  At this visit she is accompanied by her daughter Kathy.  Patient reports 90% improvement in symptoms on initiating Sinemet.  She denies any side effects on this medication.  Currently takes it at 8 AM, 12 noon and 8 PM.  She goes to bed around 10.  She was doing quite well until this Saturday when she slipped on a wet surface.  She sustained some bruising on the left hip and thigh.  She followed with her primary care physician.  Denies having any fractures.  She reports significant anxiety.  She states it is especially worse for doctors visits.  She is following up with ophthalmology for narrow angle glaucoma and states that she was unable to even sit still for the examination because when she is nervous she has a lot of tremor.\par \par Interval history September 13, 2022.  Patient is accompanied by her  to follow-up on Parkinson's disease.  She states that she is doing quite well denies any side effects has not had any falls she continues to take Sinemet 25/100, 1 tablet 3 times a day 5 hours apart.  She did go for physical therapy and is not doing the exercises at home.  States that she notices the tremor only when she is anxious.  States that her ability to do activities is  improved significantly\par \par Interval history February 1, 2023 patient is accompanied by her  to follow-up on Parkinson's disease.  She states that she is doing quite well.  She is having no difficulty with daily activities only when she is anxious such as coming to the doctor's office she notices more tremor.  No falls no difficulty swallowing memory is good no hallucinations no side effects with medication.  She is tolerating 1 tablet 3 times a day 5 hours apart.  She tries to exercise every day and stays busy with household chores

## 2023-02-01 NOTE — DATA REVIEWED
[de-identified] : MRI of the brain done 2/15/2022 without contrast shows minimal small vessel ischemic disease changes and partially empty sella

## 2023-02-01 NOTE — DISCUSSION/SUMMARY
[FreeTextEntry1] : 72-year-old right-handed female presents with chief complaints of  left arm tremor since 2021.  MRI brain with minimal small vessel changes.  Patient reports 90% improvement of symptoms with Sinemet and no side effects.  Tremors much improved.  Still noted to have moderate bradykinesia\par \par Impression- parkinsonism, most likely idiopathic Parkinson's disease\par \par Plan\par Increase Sinemet to 1-1/2 tablets 3 times a day.  Side effects were again discussed in detail\par PT OT\par May consider antianxiety medications next visit\par All questions addressed and answered\par Follow-up in 3 months

## 2023-04-20 ENCOUNTER — NON-APPOINTMENT (OUTPATIENT)
Age: 73
End: 2023-04-20

## 2023-04-20 DIAGNOSIS — M85.80 OTHER SPECIFIED DISORDERS OF BONE DENSITY AND STRUCTURE, UNSPECIFIED SITE: ICD-10-CM

## 2023-04-20 DIAGNOSIS — Z92.89 PERSONAL HISTORY OF OTHER MEDICAL TREATMENT: ICD-10-CM

## 2023-04-20 DIAGNOSIS — Z46.89 ENCOUNTER FOR FITTING AND ADJUSTMENT OF OTHER SPECIFIED DEVICES: ICD-10-CM

## 2023-04-24 ENCOUNTER — APPOINTMENT (OUTPATIENT)
Dept: UROGYNECOLOGY | Facility: CLINIC | Age: 73
End: 2023-04-24
Payer: MEDICARE

## 2023-04-24 PROCEDURE — 99213 OFFICE O/P EST LOW 20 MIN: CPT

## 2023-06-13 ENCOUNTER — APPOINTMENT (OUTPATIENT)
Dept: NEUROLOGY | Facility: CLINIC | Age: 73
End: 2023-06-13
Payer: MEDICARE

## 2023-06-13 VITALS
DIASTOLIC BLOOD PRESSURE: 81 MMHG | BODY MASS INDEX: 27.19 KG/M2 | HEIGHT: 61 IN | SYSTOLIC BLOOD PRESSURE: 148 MMHG | WEIGHT: 144 LBS | HEART RATE: 79 BPM

## 2023-06-13 PROCEDURE — 99214 OFFICE O/P EST MOD 30 MIN: CPT

## 2023-06-13 NOTE — HISTORY OF PRESENT ILLNESS
[FreeTextEntry1] : This is a 71-year-old right-handed female who presents with her daughter Natividad,with chief complaints of left hand tremor for about a year\par \par patient states that especially when she is under stress she's noticed that her left hand is shaking. This is mostly at rest. It does not interfere with daily activities. There are no changes in her fine motor skills. No slowness or stiffness\par \par she denies anosmia, sialorrhea, dysphagia, changes in memory. Denies any constipation or changes in control of urine. Denies any difficulty with sleep.No change in balance no falls\par \par reports having anxiety and recent panic attacks\par \par review of systems-a complete review of systems is performed and is negative except as listed in history of present illness\par \par past medical history- hypertension, high cholesterol panic attacks, glaucoma\par \par Interval history-April 26, 2022 patient presents to follow-up on Parkinson's disease.  At this visit she is accompanied by her daughter Kathy.  Patient reports 90% improvement in symptoms on initiating Sinemet.  She denies any side effects on this medication.  Currently takes it at 8 AM, 12 noon and 8 PM.  She goes to bed around 10.  She was doing quite well until this Saturday when she slipped on a wet surface.  She sustained some bruising on the left hip and thigh.  She followed with her primary care physician.  Denies having any fractures.  She reports significant anxiety.  She states it is especially worse for doctors visits.  She is following up with ophthalmology for narrow angle glaucoma and states that she was unable to even sit still for the examination because when she is nervous she has a lot of tremor.\par \par Interval history September 13, 2022.  Patient is accompanied by her  to follow-up on Parkinson's disease.  She states that she is doing quite well denies any side effects has not had any falls she continues to take Sinemet 25/100, 1 tablet 3 times a day 5 hours apart.  She did go for physical therapy and is not doing the exercises at home.  States that she notices the tremor only when she is anxious.  States that her ability to do activities is  improved significantly\par \par Interval history February 1, 2023 patient is accompanied by her  to follow-up on Parkinson's disease.  She states that she is doing quite well.  She is having no difficulty with daily activities only when she is anxious such as coming to the doctor's office she notices more tremor.  No falls no difficulty swallowing memory is good no hallucinations no side effects with medication.  She is tolerating 1 tablet 3 times a day 5 hours apart.  She tries to exercise every day and stays busy with household chores\par \par Interval history June 13, 2023 patient is tolerating Sinemet 25/100, 1.5 tablets 3 times a day.  Denies any wearing off denies any side effects states that symptoms are very well controlled.  No dysphagia no falls states that sometimes she gets anxious but it is manageable.  No hallucinations no changes in memory sleeps well at night.  States that she did very well after physical therapy and tries to stay active at home and does the exercises by herself is requesting another PT prescription

## 2023-06-13 NOTE — DATA REVIEWED
[de-identified] : MRI of the brain done 2/15/2022 without contrast shows minimal small vessel ischemic disease changes and partially empty sella

## 2023-06-13 NOTE — DISCUSSION/SUMMARY
[FreeTextEntry1] : 72-year-old right-handed female presents with chief complaints of  left arm tremor since 2021.  MRI brain with minimal small vessel changes.  Patient reports 90% improvement of symptoms with Sinemet and no side effects.  Tremors much improved.  Still noted to have moderate bradykinesia\par \par Impression- parkinsonism, most likely idiopathic Parkinson's disease\par \par Plan\par Continue Sinemet 1-1/2 tablets 3 times a day.  \par PT \par Offered antianxiety medications patient states that she does not need it and is doing well\par All questions addressed and answered\par Follow-up in 3 months

## 2023-06-13 NOTE — PHYSICAL EXAM
[FreeTextEntry1] : on exam patient is awake and alert. She is very anxious.\par face is symmetric, tongue and uvula midline symmetric. Extraocular movements are intact\par Strength is 5/5 in all extremities\par Deep tendon reflexes are brisk but equal\par Rare intermittent grade 1 tremor is seen in the left upper extremity. \par No postural tremor and intention tremor is seen\par  bradykinesia 0 on the right 1 on the left patient also has arthritis in her hands\par Leg agility is normal\par Rigidity zero on the right one on the left upper extremity\par No difficulty getting up from the chair\par Gait is remarkable for reduced left arm swing, stooped posture\par pull test is negative

## 2023-07-21 ENCOUNTER — APPOINTMENT (OUTPATIENT)
Dept: UROGYNECOLOGY | Facility: CLINIC | Age: 73
End: 2023-07-21
Payer: MEDICARE

## 2023-07-21 VITALS
DIASTOLIC BLOOD PRESSURE: 62 MMHG | SYSTOLIC BLOOD PRESSURE: 128 MMHG | HEIGHT: 61 IN | BODY MASS INDEX: 27.19 KG/M2 | WEIGHT: 144 LBS

## 2023-07-21 PROCEDURE — 99213 OFFICE O/P EST LOW 20 MIN: CPT

## 2023-09-29 ENCOUNTER — NON-APPOINTMENT (OUTPATIENT)
Age: 73
End: 2023-09-29

## 2023-10-05 ENCOUNTER — APPOINTMENT (OUTPATIENT)
Dept: ORTHOPEDIC SURGERY | Facility: CLINIC | Age: 73
End: 2023-10-05
Payer: MEDICARE

## 2023-10-05 VITALS — HEIGHT: 59 IN | BODY MASS INDEX: 29.23 KG/M2 | WEIGHT: 145 LBS

## 2023-10-05 PROCEDURE — 73564 X-RAY EXAM KNEE 4 OR MORE: CPT | Mod: LT

## 2023-10-05 PROCEDURE — 76882 US LMTD JT/FCL EVL NVASC XTR: CPT | Mod: LT

## 2023-10-05 PROCEDURE — 99203 OFFICE O/P NEW LOW 30 MIN: CPT | Mod: 25

## 2023-10-05 RX ORDER — MULTIVITAMIN/IRON/FOLIC ACID 18MG-0.4MG
TABLET ORAL
Refills: 0 | Status: ACTIVE | COMMUNITY

## 2023-10-05 RX ORDER — CANDESARTAN CILEXETIL 16 MG/1
16 TABLET ORAL
Refills: 0 | Status: ACTIVE | COMMUNITY

## 2023-10-13 ENCOUNTER — APPOINTMENT (OUTPATIENT)
Dept: NEUROLOGY | Facility: CLINIC | Age: 73
End: 2023-10-13
Payer: MEDICARE

## 2023-10-13 VITALS
BODY MASS INDEX: 29.23 KG/M2 | WEIGHT: 145 LBS | DIASTOLIC BLOOD PRESSURE: 73 MMHG | HEIGHT: 59 IN | SYSTOLIC BLOOD PRESSURE: 146 MMHG | HEART RATE: 75 BPM

## 2023-10-13 PROCEDURE — 99213 OFFICE O/P EST LOW 20 MIN: CPT

## 2023-10-13 PROCEDURE — 99214 OFFICE O/P EST MOD 30 MIN: CPT

## 2023-10-13 RX ORDER — GABAPENTIN 100 MG/1
100 CAPSULE ORAL 3 TIMES DAILY
Qty: 180 | Refills: 3 | Status: ACTIVE | COMMUNITY
Start: 2023-10-13 | End: 1900-01-01

## 2023-10-20 ENCOUNTER — APPOINTMENT (OUTPATIENT)
Dept: UROGYNECOLOGY | Facility: CLINIC | Age: 73
End: 2023-10-20
Payer: MEDICARE

## 2023-10-20 VITALS
DIASTOLIC BLOOD PRESSURE: 75 MMHG | SYSTOLIC BLOOD PRESSURE: 133 MMHG | HEART RATE: 73 BPM | BODY MASS INDEX: 29.23 KG/M2 | WEIGHT: 145 LBS | HEIGHT: 59 IN

## 2023-10-20 PROCEDURE — 99213 OFFICE O/P EST LOW 20 MIN: CPT

## 2023-10-24 ENCOUNTER — APPOINTMENT (OUTPATIENT)
Dept: ORTHOPEDIC SURGERY | Facility: CLINIC | Age: 73
End: 2023-10-24
Payer: MEDICARE

## 2023-10-24 VITALS — BODY MASS INDEX: 29.23 KG/M2 | HEIGHT: 59 IN | WEIGHT: 145 LBS

## 2023-10-24 PROCEDURE — 99214 OFFICE O/P EST MOD 30 MIN: CPT

## 2023-10-25 RX ORDER — CELECOXIB 200 MG/1
200 CAPSULE ORAL
Qty: 30 | Refills: 3 | Status: ACTIVE | COMMUNITY
Start: 2023-10-25 | End: 1900-01-01

## 2023-11-06 ENCOUNTER — RX RENEWAL (OUTPATIENT)
Age: 73
End: 2023-11-06

## 2023-11-27 ENCOUNTER — APPOINTMENT (OUTPATIENT)
Dept: UROGYNECOLOGY | Facility: CLINIC | Age: 73
End: 2023-11-27
Payer: MEDICARE

## 2023-11-27 PROCEDURE — 99213 OFFICE O/P EST LOW 20 MIN: CPT

## 2023-12-15 ENCOUNTER — OUTPATIENT (OUTPATIENT)
Dept: OUTPATIENT SERVICES | Facility: HOSPITAL | Age: 73
LOS: 1 days | End: 2023-12-15
Payer: MEDICARE

## 2023-12-15 ENCOUNTER — APPOINTMENT (OUTPATIENT)
Dept: MAMMOGRAPHY | Facility: CLINIC | Age: 73
End: 2023-12-15
Payer: MEDICARE

## 2023-12-15 ENCOUNTER — APPOINTMENT (OUTPATIENT)
Dept: ULTRASOUND IMAGING | Facility: CLINIC | Age: 73
End: 2023-12-15
Payer: MEDICARE

## 2023-12-15 DIAGNOSIS — Z00.8 ENCOUNTER FOR OTHER GENERAL EXAMINATION: ICD-10-CM

## 2023-12-15 PROCEDURE — 77067 SCR MAMMO BI INCL CAD: CPT

## 2023-12-15 PROCEDURE — 77063 BREAST TOMOSYNTHESIS BI: CPT | Mod: 26

## 2023-12-15 PROCEDURE — 76641 ULTRASOUND BREAST COMPLETE: CPT | Mod: 26,50,GY

## 2023-12-15 PROCEDURE — 77063 BREAST TOMOSYNTHESIS BI: CPT

## 2023-12-15 PROCEDURE — 77067 SCR MAMMO BI INCL CAD: CPT | Mod: 26

## 2023-12-15 PROCEDURE — 76641 ULTRASOUND BREAST COMPLETE: CPT

## 2024-02-15 ENCOUNTER — APPOINTMENT (OUTPATIENT)
Dept: NEUROLOGY | Facility: CLINIC | Age: 74
End: 2024-02-15
Payer: MEDICARE

## 2024-02-15 VITALS
HEART RATE: 77 BPM | BODY MASS INDEX: 29.03 KG/M2 | DIASTOLIC BLOOD PRESSURE: 75 MMHG | SYSTOLIC BLOOD PRESSURE: 137 MMHG | HEIGHT: 59 IN | WEIGHT: 144 LBS

## 2024-02-15 DIAGNOSIS — G20.A1 PARKINSON'S DISEASE WITHOUT DYSKINESIA, WITHOUT MENTION OF FLUCTUATIONS: ICD-10-CM

## 2024-02-15 PROCEDURE — G2211 COMPLEX E/M VISIT ADD ON: CPT

## 2024-02-15 PROCEDURE — 99214 OFFICE O/P EST MOD 30 MIN: CPT

## 2024-02-15 NOTE — DATA REVIEWED
[de-identified] : MRI of the brain done 2/15/2022 without contrast shows minimal small vessel ischemic disease changes and partially empty sella

## 2024-02-15 NOTE — DISCUSSION/SUMMARY
[FreeTextEntry1] : 73-year-old right-handed female presents with chief complaints of  left arm tremor since 2021.  MRI brain with minimal small vessel changes.  Patient reports 90% improvement of symptoms with Sinemet and no side effects.  Tremors much improved.    Impression- parkinsonism, most likely idiopathic Parkinson's disease  Plan Continue Sinemet 1-1/2 tablets 3 times a day.   PT  Offered antianxiety medications patient states that she does not need it and is doing well All questions addressed and answered Follow-up in 5 months

## 2024-02-15 NOTE — HISTORY OF PRESENT ILLNESS
[FreeTextEntry1] : This is a 71-year-old right-handed female who presents with her daughter Natividad,with chief complaints of left hand tremor for about a year  patient states that especially when she is under stress she's noticed that her left hand is shaking. This is mostly at rest. It does not interfere with daily activities. There are no changes in her fine motor skills. No slowness or stiffness  she denies anosmia, sialorrhea, dysphagia, changes in memory. Denies any constipation or changes in control of urine. Denies any difficulty with sleep.No change in balance no falls  reports having anxiety and recent panic attacks  review of systems-a complete review of systems is performed and is negative except as listed in history of present illness  past medical history- hypertension, high cholesterol panic attacks, glaucoma  Interval history-April 26, 2022 patient presents to follow-up on Parkinson's disease.  At this visit she is accompanied by her daughter Kathy.  Patient reports 90% improvement in symptoms on initiating Sinemet.  She denies any side effects on this medication.  Currently takes it at 8 AM, 12 noon and 8 PM.  She goes to bed around 10.  She was doing quite well until this Saturday when she slipped on a wet surface.  She sustained some bruising on the left hip and thigh.  She followed with her primary care physician.  Denies having any fractures.  She reports significant anxiety.  She states it is especially worse for doctors visits.  She is following up with ophthalmology for narrow angle glaucoma and states that she was unable to even sit still for the examination because when she is nervous she has a lot of tremor.  Interval history September 13, 2022.  Patient is accompanied by her  to follow-up on Parkinson's disease.  She states that she is doing quite well denies any side effects has not had any falls she continues to take Sinemet 25/100, 1 tablet 3 times a day 5 hours apart.  She did go for physical therapy and is not doing the exercises at home.  States that she notices the tremor only when she is anxious.  States that her ability to do activities is  improved significantly  Interval history February 1, 2023 patient is accompanied by her  to follow-up on Parkinson's disease.  She states that she is doing quite well.  She is having no difficulty with daily activities only when she is anxious such as coming to the doctor's office she notices more tremor.  No falls no difficulty swallowing memory is good no hallucinations no side effects with medication.  She is tolerating 1 tablet 3 times a day 5 hours apart.  She tries to exercise every day and stays busy with household chores  Interval history February 15, 2024 patient is tolerating Sinemet 25/100, 1.5 tablets 3 times a day.  Denies any wearing off denies any side effects states that symptoms are very well controlled.  No dysphagia no falls states that sometimes she gets anxious but it is manageable.  No hallucinations no changes in memory sleeps well at night.  States that she did very well after physical therapy and tries to stay active at home and does the exercises by herself.  Sciatica pain is better and now does not need to take gabapentin as often but still uses it as needed.  No weakness no change in bowel or bladder habits

## 2024-02-28 ENCOUNTER — APPOINTMENT (OUTPATIENT)
Dept: UROGYNECOLOGY | Facility: CLINIC | Age: 74
End: 2024-02-28
Payer: MEDICARE

## 2024-02-28 DIAGNOSIS — N81.11 CYSTOCELE, MIDLINE: ICD-10-CM

## 2024-02-28 DIAGNOSIS — N95.2 POSTMENOPAUSAL ATROPHIC VAGINITIS: ICD-10-CM

## 2024-02-28 DIAGNOSIS — N81.2 INCOMPLETE UTEROVAGINAL PROLAPSE: ICD-10-CM

## 2024-02-28 DIAGNOSIS — N81.6 RECTOCELE: ICD-10-CM

## 2024-02-28 PROCEDURE — 99213 OFFICE O/P EST LOW 20 MIN: CPT

## 2024-02-28 NOTE — PHYSICAL EXAM
[No Acute Distress] : in no acute distress [Well developed] : well developed [Well Nourished] : ~L well nourished [Good Hygeine] : demonstrates good hygeine [Oriented x3] : oriented to person, place, and time [Normal Memory] : ~T memory was ~L unimpaired [Normal Mood/Affect] : mood and affect are normal [Warm and Dry] : was warm and dry to touch [Normal Gait] : gait was normal [Labia Majora] : were normal [Vulvar Atrophy] : vulvar atrophy [Labia Minora] : were normal [Normal] : was normal [Atrophy] : atrophy [Erythematous] : erythema [Discharge] : a  ~M vaginal discharge was present [Thin] : thin [White] : white [Scant] : there was scant vaginal bleeding [FreeTextEntry4] : scant bleeding with psc removal and speculum exam

## 2024-02-28 NOTE — HISTORY OF PRESENT ILLNESS
[FreeTextEntry1] : Alicia is a 72 y/o female with h/o pelvic organ prolapse managed with pessary GH LS #2 3/4.  Patient is satisfied with the support provided by pessary. She denies pelvic pain or vaginal bleeding. She denies urinary or bowel complaints.  She is using Replens every 3 days.

## 2024-02-28 NOTE — PROCEDURE
[Good Fit] : fits well [Erythema] : erythema noted [Discharge] : there is vaginal discharge [Pessary Inserted] : inserted [Pessary Washed] : washed [Mild] : mild bleeding [Resolved w/pressure] : was resolved by applying pressure [Medication Review] : Medicaiton Review: Patient verbalizes understanding of risks and benefits [Refit] : refit is not needed [Erosion] : no evidence of erosion [Infection] : no evidence of infection [de-identified] : lateral vaginal walls [FreeTextEntry1] : Yennifer LS 2 3/4 [FreeTextEntry8] : Routine Lexii care

## 2024-02-28 NOTE — DISCUSSION/SUMMARY
[FreeTextEntry1] : #Pelvic organ prolapse: - Patient doing well with pessary - Pessary precautions and instructions reviewed - Continue with Replens - Will RTO in 3 months for follow or sooner if needed  Patient agrees to call office with any questions or concerns, verbalized understanding.

## 2024-02-29 ENCOUNTER — APPOINTMENT (OUTPATIENT)
Dept: ORTHOPEDIC SURGERY | Facility: CLINIC | Age: 74
End: 2024-02-29
Payer: MEDICARE

## 2024-02-29 VITALS — HEIGHT: 59 IN | WEIGHT: 144 LBS | BODY MASS INDEX: 29.03 KG/M2

## 2024-02-29 DIAGNOSIS — M17.12 UNILATERAL PRIMARY OSTEOARTHRITIS, LEFT KNEE: ICD-10-CM

## 2024-02-29 DIAGNOSIS — M70.62 TROCHANTERIC BURSITIS, LEFT HIP: ICD-10-CM

## 2024-02-29 PROCEDURE — 99214 OFFICE O/P EST MOD 30 MIN: CPT | Mod: 25

## 2024-02-29 PROCEDURE — 20610 DRAIN/INJ JOINT/BURSA W/O US: CPT | Mod: LT

## 2024-03-14 ENCOUNTER — APPOINTMENT (OUTPATIENT)
Dept: MRI IMAGING | Facility: HOSPITAL | Age: 74
End: 2024-03-14
Payer: MEDICARE

## 2024-03-14 ENCOUNTER — OUTPATIENT (OUTPATIENT)
Dept: OUTPATIENT SERVICES | Facility: HOSPITAL | Age: 74
LOS: 1 days | End: 2024-03-14
Payer: MEDICARE

## 2024-03-14 DIAGNOSIS — M17.12 UNILATERAL PRIMARY OSTEOARTHRITIS, LEFT KNEE: ICD-10-CM

## 2024-03-14 PROCEDURE — 73721 MRI JNT OF LWR EXTRE W/O DYE: CPT

## 2024-03-14 PROCEDURE — 73721 MRI JNT OF LWR EXTRE W/O DYE: CPT | Mod: 26,LT,MH

## 2024-03-15 ENCOUNTER — APPOINTMENT (OUTPATIENT)
Dept: ORTHOPEDIC SURGERY | Facility: CLINIC | Age: 74
End: 2024-03-15
Payer: MEDICARE

## 2024-03-15 VITALS
HEART RATE: 85 BPM | BODY MASS INDEX: 28.43 KG/M2 | HEIGHT: 59 IN | OXYGEN SATURATION: 98 % | DIASTOLIC BLOOD PRESSURE: 79 MMHG | SYSTOLIC BLOOD PRESSURE: 151 MMHG | WEIGHT: 141 LBS | TEMPERATURE: 97.8 F

## 2024-03-15 DIAGNOSIS — M54.30 SCIATICA, UNSPECIFIED SIDE: ICD-10-CM

## 2024-03-15 PROCEDURE — 99204 OFFICE O/P NEW MOD 45 MIN: CPT

## 2024-03-15 PROCEDURE — 99214 OFFICE O/P EST MOD 30 MIN: CPT

## 2024-03-15 PROCEDURE — 72082 X-RAY EXAM ENTIRE SPI 2/3 VW: CPT

## 2024-03-15 NOTE — PHYSICAL EXAM
[de-identified] : Lumbar Physical Exam   antalgic gait, Ambulates w/ cane, Pt took a few steps in office without assistive device  Forward Pitched Posture, can correct to Neutral   LT shoulder higher than RT  Sagittal balance - Normal   Compensatory mechanism? - None      Reflexes    Patellar - normal    Gastroc - normal    Clonus - No    Hip Exam - Normal   Straight leg raise - none   Pulses - 2+ dp/pt   Range of motion - normal    Sensation   Sensation intact to light touch in L1, L2, L3, L4, L5 and S1 dermatomes bilaterally     Motor             IP Quad HS TA Gastroc EHL   Right 4/5  5/5   5/5 5/5    5/5     5/5       Left   3/5  5/5   5/5 3+/5    5/5      5/5  [de-identified] : Scoliosis Rads degenerative scoliosis SVA wnl L4-5 spondylolisthesis

## 2024-03-15 NOTE — ASSESSMENT
[FreeTextEntry1] : This is a 73 year female with LT LE sxs. I had a lengthy discussion with the patient in regard to their treatment plan and diagnosis. Their symptoms have persisted despite the conservative management they have attempted thus far. As a result, I would like to proceed with a lumbar MRI. In tandem with this they should begin a physical therapy/home exercise program. The patient can take Celebrex, Tylenol/NSAIDs as needed for pain control if medically able to. I will have the patient follow-up in 3 to 4 weeks for repeat clinical evaluation, and to review their MRI results. I encouraged them to follow-up sooner if their symptoms worsen or change in any way. Conservative Treatment Statement The patient has tried the following treatments: Activity modification + Ice/Compression + Braces - NSAIDS + Physical Therapy +   Please note the above modalities have been tried for 6+ weeks over the past 2 months.

## 2024-03-15 NOTE — HISTORY OF PRESENT ILLNESS
[de-identified] : Patient is a 73 year old female who presents for initial eval of pain from radiating throughout LT LE for about 4 months, following a fall. Able to walk 5 blocks. Ambulating w/ cane. Denies leaning forward for relief of sxs. Taking Celebrex for pain relief. Pt is in PT for Parkinson's. Recently received cortisone injection in LT hip

## 2024-03-15 NOTE — ADDENDUM
[FreeTextEntry1] :  I, Celi Hoffman, acted solely as a scribe for Dr. Enzo Vyas MD on this date 03/15/2024   All medical record entries made by the Scribe were at my, Dr. Enzo Vyas MD., direction and personally dictated by me on 03/15/2024. I have reviewed the chart and agree that the record accurately reflects my personal performance of the history, physical exam, assessment and plan. I have also personally directed, reviewed, and agreed with the chart.

## 2024-03-18 ENCOUNTER — OUTPATIENT (OUTPATIENT)
Dept: OUTPATIENT SERVICES | Facility: HOSPITAL | Age: 74
LOS: 1 days | End: 2024-03-18
Payer: MEDICARE

## 2024-03-18 ENCOUNTER — APPOINTMENT (OUTPATIENT)
Dept: MRI IMAGING | Facility: HOSPITAL | Age: 74
End: 2024-03-18
Payer: MEDICARE

## 2024-03-18 DIAGNOSIS — M54.30 SCIATICA, UNSPECIFIED SIDE: ICD-10-CM

## 2024-03-18 PROCEDURE — 72148 MRI LUMBAR SPINE W/O DYE: CPT | Mod: 26,MH

## 2024-03-18 PROCEDURE — 72148 MRI LUMBAR SPINE W/O DYE: CPT

## 2024-03-22 ENCOUNTER — APPOINTMENT (OUTPATIENT)
Dept: ORTHOPEDIC SURGERY | Facility: CLINIC | Age: 74
End: 2024-03-22
Payer: MEDICARE

## 2024-03-22 VITALS
OXYGEN SATURATION: 99 % | DIASTOLIC BLOOD PRESSURE: 77 MMHG | SYSTOLIC BLOOD PRESSURE: 155 MMHG | WEIGHT: 141 LBS | HEIGHT: 60 IN | BODY MASS INDEX: 27.68 KG/M2 | HEART RATE: 92 BPM

## 2024-03-22 DIAGNOSIS — M54.9 DORSALGIA, UNSPECIFIED: ICD-10-CM

## 2024-03-22 PROCEDURE — 99215 OFFICE O/P EST HI 40 MIN: CPT

## 2024-03-22 RX ORDER — DICLOFENAC SODIUM 75 MG/1
75 TABLET, DELAYED RELEASE ORAL
Qty: 30 | Refills: 0 | Status: ACTIVE | COMMUNITY
Start: 2024-03-22 | End: 1900-01-01

## 2024-03-22 NOTE — PHYSICAL EXAM
[de-identified] : Lumbar Physical Exam   antalgic gait, Ambulates w/ cane, Pt took a few steps in office without assistive device  Forward Pitched Posture, can correct to Neutral   LT shoulder higher than RT  Sagittal balance - Normal   Compensatory mechanism? - None      Reflexes    Patellar - normal    Gastroc - normal    Clonus - No    Hip Exam - Normal   Straight leg raise - none   Pulses - 2+ dp/pt   Range of motion - normal    Sensation   Sensation intact to light touch in L1, L2, L3, L4, L5 and S1 dermatomes bilaterally     Motor             IP Quad HS TA Gastroc EHL   Right 4/5  5/5   5/5 5/5    5/5     5/5       Left   3/5  5/5   5/5 3+/5    5/5      5/5  [de-identified] : Lumbar MRI L3-L4 disc herniation w/ caudal extrusion  L4-L5 spondylolisthesis L4-L5 spinal stenosis  Scoliosis Rads degenerative scoliosis SVA wnl L4-5 spondylolisthesis

## 2024-03-22 NOTE — ADDENDUM
[FreeTextEntry1] :  I, Celi Hoffman, acted solely as a scribe for Dr. Enzo Vyas MD on this date 03/22/2024   All medical record entries made by the Scribe were at my, Dr. Enzo Vyas MD., direction and personally dictated by me on 03/22/2024. I have reviewed the chart and agree that the record accurately reflects my personal performance of the history, physical exam, assessment and plan. I have also personally directed, reviewed, and agreed with the chart.

## 2024-03-22 NOTE — HISTORY OF PRESENT ILLNESS
[de-identified] : Patient is a 73 year old female who presents for f/u eval of pain from radiating throughout LT LE for about 4 months. Details increased LT LE pain and lower bp. Continuing to ambulate w/ a cane.  Denies any bowel/bladder incontinence. Denies any saddle anesthesia.  03.15.24 Patient is a 73 year old female who presents for initial eval of pain from radiating throughout LT LE for about 4 months, following a fall. Able to walk 5 blocks. Ambulating w/ cane. Denies leaning forward for relief of sxs. Taking Celebrex for pain relief. Pt is in PT for Parkinson's. Recently received cortisone injection in LT hip

## 2024-03-22 NOTE — ASSESSMENT
[FreeTextEntry1] : I had a lengthy discussion with the patient in regard to their treatment plan and diagnosis. Their symptoms have persisted despite the conservative management they have attempted thus far. At this time, we will maximize conservative treatment. As a result, I would like to proceed with a referral to a pain management specialist to see if they are eligible for an epidural injection and other non-operative treatment options. In tandem with this they should continue with physical therapy/home therapy program. The patient can take Tylenol/NSAIDs as needed for pain control if medically able to. I will have patient follow-up in 4 to 5 weeks for repeat clinical evaluation. I encouraged them to follow-up sooner if their symptoms worsen or change in any way. We did discuss pros/cons of surgery. She may need a 2 level procedure, possible L4-L5 psf.

## 2024-04-05 RX ORDER — GABAPENTIN 100 MG/1
100 CAPSULE ORAL TWICE DAILY
Qty: 28 | Refills: 0 | Status: ACTIVE | COMMUNITY
Start: 2024-03-22 | End: 1900-01-01

## 2024-04-11 ENCOUNTER — APPOINTMENT (OUTPATIENT)
Dept: ORTHOPEDIC SURGERY | Facility: CLINIC | Age: 74
End: 2024-04-11

## 2024-04-18 ENCOUNTER — RX RENEWAL (OUTPATIENT)
Age: 74
End: 2024-04-18

## 2024-04-19 ENCOUNTER — APPOINTMENT (OUTPATIENT)
Dept: ORTHOPEDIC SURGERY | Facility: CLINIC | Age: 74
End: 2024-04-19

## 2024-05-01 ENCOUNTER — RX RENEWAL (OUTPATIENT)
Age: 74
End: 2024-05-01

## 2024-05-01 RX ORDER — CARBIDOPA AND LEVODOPA 25; 100 MG/1; MG/1
25-100 TABLET ORAL 3 TIMES DAILY
Qty: 270 | Refills: 3 | Status: ACTIVE | COMMUNITY
Start: 2021-09-13 | End: 1900-01-01

## 2024-05-10 ENCOUNTER — APPOINTMENT (OUTPATIENT)
Dept: CT IMAGING | Facility: HOSPITAL | Age: 74
End: 2024-05-10

## 2024-05-10 ENCOUNTER — APPOINTMENT (OUTPATIENT)
Dept: RADIOLOGY | Facility: HOSPITAL | Age: 74
End: 2024-05-10

## 2024-05-29 ENCOUNTER — RX RENEWAL (OUTPATIENT)
Age: 74
End: 2024-05-29

## 2024-05-29 RX ORDER — DICLOFENAC SODIUM 75 MG/1
75 TABLET, DELAYED RELEASE ORAL
Qty: 60 | Refills: 0 | Status: ACTIVE | COMMUNITY
Start: 2023-10-09 | End: 1900-01-01

## 2024-05-30 ENCOUNTER — APPOINTMENT (OUTPATIENT)
Dept: UROGYNECOLOGY | Facility: CLINIC | Age: 74
End: 2024-05-30

## 2024-06-07 ENCOUNTER — APPOINTMENT (OUTPATIENT)
Dept: RADIOLOGY | Facility: HOSPITAL | Age: 74
End: 2024-06-07
Payer: MEDICARE

## 2024-06-07 ENCOUNTER — OUTPATIENT (OUTPATIENT)
Dept: OUTPATIENT SERVICES | Facility: HOSPITAL | Age: 74
LOS: 1 days | End: 2024-06-07
Payer: MEDICARE

## 2024-06-07 ENCOUNTER — APPOINTMENT (OUTPATIENT)
Dept: CT IMAGING | Facility: HOSPITAL | Age: 74
End: 2024-06-07
Payer: MEDICARE

## 2024-06-07 DIAGNOSIS — M48.061 SPINAL STENOSIS, LUMBAR REGION WITHOUT NEUROGENIC CLAUDICATION: ICD-10-CM

## 2024-06-07 DIAGNOSIS — M43.10 SPONDYLOLISTHESIS, SITE UNSPECIFIED: ICD-10-CM

## 2024-06-07 DIAGNOSIS — M54.16 RADICULOPATHY, LUMBAR REGION: ICD-10-CM

## 2024-06-07 PROCEDURE — 72131 CT LUMBAR SPINE W/O DYE: CPT | Mod: MH

## 2024-06-07 PROCEDURE — 72131 CT LUMBAR SPINE W/O DYE: CPT | Mod: 26,MH

## 2024-06-07 PROCEDURE — 77085 DXA BONE DENSITY AXL VRT FX: CPT | Mod: 26

## 2024-06-07 PROCEDURE — 77085 DXA BONE DENSITY AXL VRT FX: CPT

## 2024-07-01 ENCOUNTER — APPOINTMENT (OUTPATIENT)
Dept: ORTHOPEDIC SURGERY | Facility: CLINIC | Age: 74
End: 2024-07-01

## 2024-07-01 VITALS
SYSTOLIC BLOOD PRESSURE: 138 MMHG | HEART RATE: 76 BPM | HEIGHT: 60 IN | DIASTOLIC BLOOD PRESSURE: 79 MMHG | BODY MASS INDEX: 27.88 KG/M2 | TEMPERATURE: 97.8 F | WEIGHT: 142 LBS | OXYGEN SATURATION: 98 %

## 2024-07-01 PROCEDURE — 99214 OFFICE O/P EST MOD 30 MIN: CPT

## 2024-07-05 ENCOUNTER — RX RENEWAL (OUTPATIENT)
Age: 74
End: 2024-07-05

## 2024-07-09 ENCOUNTER — APPOINTMENT (OUTPATIENT)
Dept: UROGYNECOLOGY | Facility: CLINIC | Age: 74
End: 2024-07-09
Payer: MEDICARE

## 2024-07-09 DIAGNOSIS — N95.2 POSTMENOPAUSAL ATROPHIC VAGINITIS: ICD-10-CM

## 2024-07-09 DIAGNOSIS — N81.11 CYSTOCELE, MIDLINE: ICD-10-CM

## 2024-07-09 DIAGNOSIS — N81.2 INCOMPLETE UTEROVAGINAL PROLAPSE: ICD-10-CM

## 2024-07-09 PROCEDURE — 99213 OFFICE O/P EST LOW 20 MIN: CPT

## 2024-07-09 PROCEDURE — 99459 PELVIC EXAMINATION: CPT

## 2024-07-09 PROCEDURE — G2211 COMPLEX E/M VISIT ADD ON: CPT

## 2024-07-17 ENCOUNTER — APPOINTMENT (OUTPATIENT)
Dept: NEUROLOGY | Facility: CLINIC | Age: 74
End: 2024-07-17

## 2024-07-25 ENCOUNTER — NON-APPOINTMENT (OUTPATIENT)
Age: 74
End: 2024-07-25

## 2024-09-03 ENCOUNTER — RX RENEWAL (OUTPATIENT)
Age: 74
End: 2024-09-03

## 2024-09-17 ENCOUNTER — APPOINTMENT (OUTPATIENT)
Dept: NEUROLOGY | Facility: CLINIC | Age: 74
End: 2024-09-17
Payer: MEDICARE

## 2024-09-17 VITALS
HEART RATE: 86 BPM | SYSTOLIC BLOOD PRESSURE: 144 MMHG | BODY MASS INDEX: 28.27 KG/M2 | HEIGHT: 60 IN | DIASTOLIC BLOOD PRESSURE: 77 MMHG | WEIGHT: 144 LBS

## 2024-09-17 DIAGNOSIS — G20.A1 PARKINSON'S DISEASE WITHOUT DYSKINESIA, WITHOUT MENTION OF FLUCTUATIONS: ICD-10-CM

## 2024-09-17 PROCEDURE — G2211 COMPLEX E/M VISIT ADD ON: CPT

## 2024-09-17 PROCEDURE — 99214 OFFICE O/P EST MOD 30 MIN: CPT

## 2024-09-17 NOTE — DATA REVIEWED
[de-identified] : MRI of the brain done 2/15/2022 without contrast shows minimal small vessel ischemic disease changes and partially empty sella

## 2024-09-17 NOTE — DISCUSSION/SUMMARY
[FreeTextEntry1] : 73-year-old right-handed female presents with chief complaints of  left arm tremor since 2021.  MRI brain with minimal small vessel changes.  Patient reports 90% improvement of symptoms with Sinemet and no side effects.  Tremors much improved.    Impression- parkinsonism, most likely idiopathic Parkinson's disease  Plan Continue Sinemet 1-1/2 tablets 3 times a day.   PT  Offered antianxiety medications patient states that she does not need it and is doing well All questions addressed and answered Follow-up in 4-5 months We discussed the above impression, plan and recommendation during the visit. Counseling represented more than 50% of the 30-minute visit time

## 2024-09-17 NOTE — HISTORY OF PRESENT ILLNESS
[FreeTextEntry1] : This is a 71-year-old right-handed female who presents with her daughter Natividad,with chief complaints of left hand tremor for about a year  patient states that especially when she is under stress she's noticed that her left hand is shaking. This is mostly at rest. It does not interfere with daily activities. There are no changes in her fine motor skills. No slowness or stiffness  she denies anosmia, sialorrhea, dysphagia, changes in memory. Denies any constipation or changes in control of urine. Denies any difficulty with sleep.No change in balance no falls  reports having anxiety and recent panic attacks  review of systems-a complete review of systems is performed and is negative except as listed in history of present illness  past medical history- hypertension, high cholesterol panic attacks, glaucoma  Interval history-April 26, 2022 patient presents to follow-up on Parkinson's disease.  At this visit she is accompanied by her daughter Kathy.  Patient reports 90% improvement in symptoms on initiating Sinemet.  She denies any side effects on this medication.  Currently takes it at 8 AM, 12 noon and 8 PM.  She goes to bed around 10.  She was doing quite well until this Saturday when she slipped on a wet surface.  She sustained some bruising on the left hip and thigh.  She followed with her primary care physician.  Denies having any fractures.  She reports significant anxiety.  She states it is especially worse for doctors visits.  She is following up with ophthalmology for narrow angle glaucoma and states that she was unable to even sit still for the examination because when she is nervous she has a lot of tremor.  Interval history September 13, 2022.  Patient is accompanied by her  to follow-up on Parkinson's disease.  She states that she is doing quite well denies any side effects has not had any falls she continues to take Sinemet 25/100, 1 tablet 3 times a day 5 hours apart.  She did go for physical therapy and is not doing the exercises at home.  States that she notices the tremor only when she is anxious.  States that her ability to do activities is  improved significantly  Interval history February 1, 2023 patient is accompanied by her  to follow-up on Parkinson's disease.  She states that she is doing quite well.  She is having no difficulty with daily activities only when she is anxious such as coming to the doctor's office she notices more tremor.  No falls no difficulty swallowing memory is good no hallucinations no side effects with medication.  She is tolerating 1 tablet 3 times a day 5 hours apart.  She tries to exercise every day and stays busy with household chores  Interval history February 15, 2024 patient is tolerating Sinemet 25/100, 1.5 tablets 3 times a day.  Denies any wearing off denies any side effects states that symptoms are very well controlled.  No dysphagia no falls states that sometimes she gets anxious but it is manageable.  No hallucinations no changes in memory sleeps well at night.  States that she did very well after physical therapy and tries to stay active at home and does the exercises by herself.  Sciatica pain is better and now does not need to take gabapentin as often but still uses it as needed.  No weakness no change in bowel or bladder habits  Interval history September 17, 2024.  Patient presents with her  to follow-up on Parkinson's disease.  She states that she is doing quite well she takes Sinemet 25/100, 1.5 tablets 3 times a day 5 hours apart there is no wearing off no side effects.  Reports occasional anxiety but states it is manageable and does not wish to be on a medication for it.  She denies any hallucinations no difficulty with sleep.  States that her sciatica is acting up again she did see pain management doctors and was told that she has herniated disc.  She also has osteoporosis for which she is taking medication.  She denies any weakness no difficulty with bowel or bladder control.  No falls no dysphagia

## 2024-10-08 ENCOUNTER — APPOINTMENT (OUTPATIENT)
Dept: UROGYNECOLOGY | Facility: CLINIC | Age: 74
End: 2024-10-08

## 2024-10-23 ENCOUNTER — APPOINTMENT (OUTPATIENT)
Dept: UROGYNECOLOGY | Facility: CLINIC | Age: 74
End: 2024-10-23
Payer: MEDICARE

## 2024-10-23 VITALS
HEIGHT: 60 IN | WEIGHT: 145 LBS | SYSTOLIC BLOOD PRESSURE: 136 MMHG | BODY MASS INDEX: 28.47 KG/M2 | DIASTOLIC BLOOD PRESSURE: 80 MMHG | HEART RATE: 85 BPM

## 2024-10-23 DIAGNOSIS — N81.6 RECTOCELE: ICD-10-CM

## 2024-10-23 DIAGNOSIS — N81.11 CYSTOCELE, MIDLINE: ICD-10-CM

## 2024-10-23 DIAGNOSIS — N81.2 INCOMPLETE UTEROVAGINAL PROLAPSE: ICD-10-CM

## 2024-10-23 DIAGNOSIS — N95.2 POSTMENOPAUSAL ATROPHIC VAGINITIS: ICD-10-CM

## 2024-10-23 PROCEDURE — 99459 PELVIC EXAMINATION: CPT

## 2024-10-23 PROCEDURE — 99213 OFFICE O/P EST LOW 20 MIN: CPT

## 2024-10-23 PROCEDURE — G2211 COMPLEX E/M VISIT ADD ON: CPT

## 2024-11-14 NOTE — HISTORY OF PRESENT ILLNESS
Patient with past history of tobacco abuse on chronic use of oxygen up to 6-7 L NC  Quit smoking years ago.  Request and ordered for home suction device 11/15  Extended respiratory viral panel testing negative  Nebulizer, steroids  Transitioned off bipap, on baseline home oxygen  echo cardiogram:  CONCLUSIONS  Normal biventricular systolic functions, normal estimated LVEF 60%  Mild mitral annular calcifications  Mild aortic valve sclerosis without stenosis  Normal IVC size  No pericardial effusion  Unable to estimate RVSP   [FreeTextEntry1] : This is a 71-year-old right-handed female who presents with her daughter Natividad,with chief complaints of left hand tremor for about a year\par \par patient states that especially when she is under stress she's noticed that her left hand is shaking. This is mostly at rest. It does not interfere with daily activities. There are no changes in her fine motor skills. No slowness or stiffness\par \par she denies anosmia, sialorrhea, dysphagia, changes in memory. Denies any constipation or changes in control of urine. Denies any difficulty with sleep.No change in balance no falls\par \par reports having anxiety and recent panic attacks\par \par review of systems-a complete review of systems is performed and is negative except as listed in history of present illness\par \par past medical history- hypertension, high cholesterol panic attacks, glaucoma\par \par Interval history-April 26, 2022 patient presents to follow-up on Parkinson's disease.  At this visit she is accompanied by her daughter Kathy.  Patient reports 90% improvement in symptoms on initiating Sinemet.  She denies any side effects on this medication.  Currently takes it at 8 AM, 12 noon and 8 PM.  She goes to bed around 10.  She was doing quite well until this Saturday when she slipped on a wet surface.  She sustained some bruising on the left hip and thigh.  She followed with her primary care physician.  Denies having any fractures.  She reports significant anxiety.  She states it is especially worse for doctors visits.  She is following up with ophthalmology for narrow angle glaucoma and states that she was unable to even sit still for the examination because when she is nervous she has a lot of tremor.

## 2025-01-22 ENCOUNTER — APPOINTMENT (OUTPATIENT)
Dept: UROGYNECOLOGY | Facility: CLINIC | Age: 75
End: 2025-01-22
Payer: MEDICARE

## 2025-01-22 DIAGNOSIS — N81.11 CYSTOCELE, MIDLINE: ICD-10-CM

## 2025-01-22 DIAGNOSIS — N95.2 POSTMENOPAUSAL ATROPHIC VAGINITIS: ICD-10-CM

## 2025-01-22 DIAGNOSIS — N81.2 INCOMPLETE UTEROVAGINAL PROLAPSE: ICD-10-CM

## 2025-01-22 PROCEDURE — 99213 OFFICE O/P EST LOW 20 MIN: CPT

## 2025-01-22 PROCEDURE — G2211 COMPLEX E/M VISIT ADD ON: CPT

## 2025-03-05 ENCOUNTER — APPOINTMENT (OUTPATIENT)
Dept: NEUROLOGY | Facility: CLINIC | Age: 75
End: 2025-03-05
Payer: MEDICARE

## 2025-03-05 ENCOUNTER — NON-APPOINTMENT (OUTPATIENT)
Age: 75
End: 2025-03-05

## 2025-03-05 VITALS
HEIGHT: 60 IN | DIASTOLIC BLOOD PRESSURE: 76 MMHG | SYSTOLIC BLOOD PRESSURE: 143 MMHG | WEIGHT: 145 LBS | BODY MASS INDEX: 28.47 KG/M2 | HEART RATE: 83 BPM

## 2025-03-05 DIAGNOSIS — G20.A1 PARKINSON'S DISEASE WITHOUT DYSKINESIA, WITHOUT MENTION OF FLUCTUATIONS: ICD-10-CM

## 2025-03-05 PROCEDURE — 99214 OFFICE O/P EST MOD 30 MIN: CPT

## 2025-03-31 ENCOUNTER — APPOINTMENT (OUTPATIENT)
Dept: ULTRASOUND IMAGING | Facility: CLINIC | Age: 75
End: 2025-03-31

## 2025-04-02 ENCOUNTER — OUTPATIENT (OUTPATIENT)
Dept: OUTPATIENT SERVICES | Facility: HOSPITAL | Age: 75
LOS: 1 days | End: 2025-04-02
Payer: MEDICARE

## 2025-04-02 ENCOUNTER — APPOINTMENT (OUTPATIENT)
Dept: ULTRASOUND IMAGING | Facility: HOSPITAL | Age: 75
End: 2025-04-02
Payer: MEDICARE

## 2025-04-02 DIAGNOSIS — Z00.8 ENCOUNTER FOR OTHER GENERAL EXAMINATION: ICD-10-CM

## 2025-04-02 PROCEDURE — 76536 US EXAM OF HEAD AND NECK: CPT | Mod: 26

## 2025-04-02 PROCEDURE — 76536 US EXAM OF HEAD AND NECK: CPT

## 2025-04-21 ENCOUNTER — NON-APPOINTMENT (OUTPATIENT)
Age: 75
End: 2025-04-21

## 2025-04-22 ENCOUNTER — APPOINTMENT (OUTPATIENT)
Dept: UROGYNECOLOGY | Facility: CLINIC | Age: 75
End: 2025-04-22

## 2025-04-22 VITALS
HEART RATE: 83 BPM | HEIGHT: 60 IN | BODY MASS INDEX: 29.45 KG/M2 | DIASTOLIC BLOOD PRESSURE: 73 MMHG | SYSTOLIC BLOOD PRESSURE: 132 MMHG | WEIGHT: 150 LBS

## 2025-04-22 DIAGNOSIS — N81.11 CYSTOCELE, MIDLINE: ICD-10-CM

## 2025-04-22 DIAGNOSIS — N81.6 RECTOCELE: ICD-10-CM

## 2025-04-22 DIAGNOSIS — N95.2 POSTMENOPAUSAL ATROPHIC VAGINITIS: ICD-10-CM

## 2025-04-22 DIAGNOSIS — N81.2 INCOMPLETE UTEROVAGINAL PROLAPSE: ICD-10-CM

## 2025-04-22 PROCEDURE — G2211 COMPLEX E/M VISIT ADD ON: CPT

## 2025-04-22 PROCEDURE — 99459 PELVIC EXAMINATION: CPT

## 2025-04-22 PROCEDURE — 99213 OFFICE O/P EST LOW 20 MIN: CPT

## 2025-06-13 ENCOUNTER — OUTPATIENT (OUTPATIENT)
Dept: OUTPATIENT SERVICES | Facility: HOSPITAL | Age: 75
LOS: 1 days | End: 2025-06-13
Payer: MEDICARE

## 2025-06-13 ENCOUNTER — APPOINTMENT (OUTPATIENT)
Dept: MAMMOGRAPHY | Facility: HOSPITAL | Age: 75
End: 2025-06-13
Payer: MEDICARE

## 2025-06-13 ENCOUNTER — APPOINTMENT (OUTPATIENT)
Dept: ULTRASOUND IMAGING | Facility: HOSPITAL | Age: 75
End: 2025-06-13
Payer: MEDICARE

## 2025-06-13 DIAGNOSIS — Z00.8 ENCOUNTER FOR OTHER GENERAL EXAMINATION: ICD-10-CM

## 2025-06-13 PROCEDURE — 77067 SCR MAMMO BI INCL CAD: CPT | Mod: 26

## 2025-06-13 PROCEDURE — 77067 SCR MAMMO BI INCL CAD: CPT

## 2025-06-13 PROCEDURE — 77063 BREAST TOMOSYNTHESIS BI: CPT | Mod: 26

## 2025-06-13 PROCEDURE — 76641 ULTRASOUND BREAST COMPLETE: CPT

## 2025-06-13 PROCEDURE — 77063 BREAST TOMOSYNTHESIS BI: CPT

## 2025-06-13 PROCEDURE — 76641 ULTRASOUND BREAST COMPLETE: CPT | Mod: 26,50,GA

## 2025-06-20 ENCOUNTER — OUTPATIENT (OUTPATIENT)
Dept: OUTPATIENT SERVICES | Facility: HOSPITAL | Age: 75
LOS: 1 days | End: 2025-06-20
Payer: MEDICARE

## 2025-06-20 ENCOUNTER — APPOINTMENT (OUTPATIENT)
Dept: RADIOLOGY | Facility: HOSPITAL | Age: 75
End: 2025-06-20

## 2025-06-20 DIAGNOSIS — M81.0 AGE-RELATED OSTEOPOROSIS WITHOUT CURRENT PATHOLOGICAL FRACTURE: ICD-10-CM

## 2025-06-20 PROCEDURE — 77080 DXA BONE DENSITY AXIAL: CPT

## 2025-06-20 PROCEDURE — 77080 DXA BONE DENSITY AXIAL: CPT | Mod: 26

## 2025-07-22 ENCOUNTER — APPOINTMENT (OUTPATIENT)
Dept: UROGYNECOLOGY | Facility: CLINIC | Age: 75
End: 2025-07-22
Payer: MEDICARE

## 2025-07-22 VITALS
SYSTOLIC BLOOD PRESSURE: 130 MMHG | WEIGHT: 150 LBS | BODY MASS INDEX: 29.45 KG/M2 | DIASTOLIC BLOOD PRESSURE: 70 MMHG | HEIGHT: 60 IN | HEART RATE: 83 BPM

## 2025-07-22 DIAGNOSIS — N95.2 POSTMENOPAUSAL ATROPHIC VAGINITIS: ICD-10-CM

## 2025-07-22 DIAGNOSIS — N81.11 CYSTOCELE, MIDLINE: ICD-10-CM

## 2025-07-22 DIAGNOSIS — N81.6 RECTOCELE: ICD-10-CM

## 2025-07-22 DIAGNOSIS — N81.2 INCOMPLETE UTEROVAGINAL PROLAPSE: ICD-10-CM

## 2025-07-22 PROCEDURE — 99459 PELVIC EXAMINATION: CPT

## 2025-07-22 PROCEDURE — 99213 OFFICE O/P EST LOW 20 MIN: CPT

## 2025-07-22 PROCEDURE — G2211 COMPLEX E/M VISIT ADD ON: CPT

## 2025-08-05 ENCOUNTER — APPOINTMENT (OUTPATIENT)
Dept: NEUROLOGY | Facility: CLINIC | Age: 75
End: 2025-08-05
Payer: MEDICARE

## 2025-08-05 VITALS
HEART RATE: 76 BPM | BODY MASS INDEX: 29.45 KG/M2 | HEIGHT: 60 IN | SYSTOLIC BLOOD PRESSURE: 128 MMHG | DIASTOLIC BLOOD PRESSURE: 74 MMHG | WEIGHT: 150 LBS

## 2025-08-05 DIAGNOSIS — G20.A1 PARKINSON'S DISEASE WITHOUT DYSKINESIA, WITHOUT MENTION OF FLUCTUATIONS: ICD-10-CM

## 2025-08-05 PROCEDURE — G2211 COMPLEX E/M VISIT ADD ON: CPT

## 2025-08-05 PROCEDURE — 99214 OFFICE O/P EST MOD 30 MIN: CPT
